# Patient Record
Sex: MALE | Race: WHITE | Employment: FULL TIME | URBAN - METROPOLITAN AREA
[De-identification: names, ages, dates, MRNs, and addresses within clinical notes are randomized per-mention and may not be internally consistent; named-entity substitution may affect disease eponyms.]

---

## 2017-02-13 PROBLEM — Z00.00 WELLNESS EXAMINATION: Status: ACTIVE | Noted: 2017-02-13

## 2017-04-11 ENCOUNTER — HOSPITAL ENCOUNTER (OUTPATIENT)
Dept: GENERAL RADIOLOGY | Age: 62
Discharge: HOME | End: 2017-04-11
Attending: FAMILY MEDICINE

## 2017-04-11 DIAGNOSIS — Z13.9 SCREENING PROCEDURE: ICD-10-CM

## 2017-04-11 PROCEDURE — 71020 XR CHEST PA LAT: CPT

## 2018-03-02 ENCOUNTER — HOSPITAL ENCOUNTER (OUTPATIENT)
Dept: ULTRASOUND IMAGING | Age: 63
Discharge: HOME | End: 2018-03-02
Attending: NURSE PRACTITIONER
Payer: COMMERCIAL

## 2018-03-02 DIAGNOSIS — I10 ESSENTIAL HYPERTENSION, BENIGN: ICD-10-CM

## 2018-03-02 DIAGNOSIS — I10 ESSENTIAL HYPERTENSION: ICD-10-CM

## 2018-03-02 PROCEDURE — 93975 VASCULAR STUDY: CPT

## 2019-01-03 ENCOUNTER — HOSPITAL ENCOUNTER (OUTPATIENT)
Dept: GENERAL RADIOLOGY | Age: 64
Discharge: HOME | End: 2019-01-03
Attending: NURSE PRACTITIONER
Payer: COMMERCIAL

## 2019-01-03 DIAGNOSIS — M25.511 ACUTE PAIN OF RIGHT SHOULDER: ICD-10-CM

## 2019-01-03 DIAGNOSIS — M25.532 LEFT WRIST PAIN: ICD-10-CM

## 2019-01-03 PROCEDURE — 73030 X-RAY EXAM OF SHOULDER: CPT

## 2019-01-03 PROCEDURE — 73110 X-RAY EXAM OF WRIST: CPT

## 2019-01-09 ENCOUNTER — HOSPITAL ENCOUNTER (OUTPATIENT)
Dept: PHYSICAL THERAPY | Age: 64
Discharge: HOME | End: 2019-01-09
Attending: NURSE PRACTITIONER
Payer: COMMERCIAL

## 2019-01-09 DIAGNOSIS — M25.511 ACUTE PAIN OF RIGHT SHOULDER: ICD-10-CM

## 2019-01-09 DIAGNOSIS — M25.532 LEFT WRIST PAIN: ICD-10-CM
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
## 2019-01-09 PROCEDURE — 97140 MANUAL THERAPY 1/> REGIONS: CPT

## 2019-01-09 PROCEDURE — 97161 PT EVAL LOW COMPLEX 20 MIN: CPT
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
## 2019-01-11 ENCOUNTER — APPOINTMENT (OUTPATIENT)
Dept: PHYSICAL THERAPY | Age: 64
End: 2019-01-11
Attending: NURSE PRACTITIONER

## 2019-01-11 ENCOUNTER — HOSPITAL ENCOUNTER (OUTPATIENT)
Dept: PHYSICAL THERAPY | Age: 64
Discharge: HOME | End: 2019-01-11
Attending: NURSE PRACTITIONER
Payer: COMMERCIAL
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
## 2019-01-11 DIAGNOSIS — M25.532 LEFT WRIST PAIN: ICD-10-CM

## 2019-01-11 PROCEDURE — 97165 OT EVAL LOW COMPLEX 30 MIN: CPT

## 2019-01-11 PROCEDURE — 97140 MANUAL THERAPY 1/> REGIONS: CPT

## 2019-01-11 PROCEDURE — 97110 THERAPEUTIC EXERCISES: CPT

## 2019-01-11 PROCEDURE — 97760 ORTHOTIC MGMT&TRAING 1ST ENC: CPT
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
## 2019-01-15 ENCOUNTER — HOSPITAL ENCOUNTER (OUTPATIENT)
Dept: PHYSICAL THERAPY | Age: 64
Discharge: HOME | End: 2019-01-15
Attending: NURSE PRACTITIONER
Payer: COMMERCIAL

## 2019-01-15 PROCEDURE — 97110 THERAPEUTIC EXERCISES: CPT

## 2019-01-15 PROCEDURE — 97140 MANUAL THERAPY 1/> REGIONS: CPT
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
## 2019-01-17 ENCOUNTER — HOSPITAL ENCOUNTER (OUTPATIENT)
Dept: PHYSICAL THERAPY | Age: 64
Discharge: HOME | End: 2019-01-17
Attending: NURSE PRACTITIONER
Payer: COMMERCIAL

## 2019-01-17 PROCEDURE — 97035 APP MDLTY 1+ULTRASOUND EA 15: CPT

## 2019-01-17 PROCEDURE — 97018 PARAFFIN BATH THERAPY: CPT

## 2019-01-17 PROCEDURE — 97140 MANUAL THERAPY 1/> REGIONS: CPT

## 2019-01-17 PROCEDURE — 97110 THERAPEUTIC EXERCISES: CPT
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
## 2019-01-22 ENCOUNTER — HOSPITAL ENCOUNTER (OUTPATIENT)
Dept: PHYSICAL THERAPY | Age: 64
Discharge: HOME | End: 2019-01-22
Attending: NURSE PRACTITIONER
Payer: COMMERCIAL

## 2019-01-22 PROCEDURE — 97110 THERAPEUTIC EXERCISES: CPT

## 2019-01-22 PROCEDURE — 97140 MANUAL THERAPY 1/> REGIONS: CPT

## 2019-01-22 PROCEDURE — 97032 APPL MODALITY 1+ESTIM EA 15: CPT

## 2019-01-22 PROCEDURE — 97035 APP MDLTY 1+ULTRASOUND EA 15: CPT
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
## 2019-01-24 ENCOUNTER — HOSPITAL ENCOUNTER (OUTPATIENT)
Dept: PHYSICAL THERAPY | Age: 64
Discharge: HOME | End: 2019-01-24
Attending: NURSE PRACTITIONER
Payer: COMMERCIAL

## 2019-01-24 PROCEDURE — 97140 MANUAL THERAPY 1/> REGIONS: CPT

## 2019-01-24 PROCEDURE — 97110 THERAPEUTIC EXERCISES: CPT
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
## 2019-01-31 ENCOUNTER — HOSPITAL ENCOUNTER (OUTPATIENT)
Dept: PHYSICAL THERAPY | Age: 64
Discharge: HOME | End: 2019-01-31
Attending: NURSE PRACTITIONER
Payer: COMMERCIAL

## 2019-01-31 PROCEDURE — 97018 PARAFFIN BATH THERAPY: CPT

## 2019-01-31 PROCEDURE — 97140 MANUAL THERAPY 1/> REGIONS: CPT

## 2019-01-31 PROCEDURE — 97032 APPL MODALITY 1+ESTIM EA 15: CPT

## 2019-01-31 PROCEDURE — 97110 THERAPEUTIC EXERCISES: CPT

## 2019-01-31 PROCEDURE — 97035 APP MDLTY 1+ULTRASOUND EA 15: CPT
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
## 2019-02-05 ENCOUNTER — HOSPITAL ENCOUNTER (OUTPATIENT)
Dept: PHYSICAL THERAPY | Age: 64
Discharge: HOME | End: 2019-02-05
Attending: NURSE PRACTITIONER
Payer: COMMERCIAL

## 2019-02-05 PROCEDURE — 97140 MANUAL THERAPY 1/> REGIONS: CPT

## 2019-02-05 PROCEDURE — 97032 APPL MODALITY 1+ESTIM EA 15: CPT

## 2019-02-05 PROCEDURE — 97035 APP MDLTY 1+ULTRASOUND EA 15: CPT

## 2019-02-05 PROCEDURE — 97110 THERAPEUTIC EXERCISES: CPT

## 2019-02-05 PROCEDURE — 97018 PARAFFIN BATH THERAPY: CPT
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
## 2019-02-07 ENCOUNTER — HOSPITAL ENCOUNTER (OUTPATIENT)
Dept: PHYSICAL THERAPY | Age: 64
Discharge: HOME | End: 2019-02-07
Attending: NURSE PRACTITIONER
Payer: COMMERCIAL

## 2019-02-07 PROCEDURE — 97110 THERAPEUTIC EXERCISES: CPT

## 2019-02-07 PROCEDURE — 97140 MANUAL THERAPY 1/> REGIONS: CPT
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
## 2019-02-12 ENCOUNTER — APPOINTMENT (OUTPATIENT)
Dept: PHYSICAL THERAPY | Age: 64
End: 2019-02-12
Attending: NURSE PRACTITIONER
Payer: COMMERCIAL

## 2019-02-12 ENCOUNTER — HOSPITAL ENCOUNTER (OUTPATIENT)
Dept: PHYSICAL THERAPY | Age: 64
Discharge: HOME | End: 2019-02-12
Attending: NURSE PRACTITIONER
Payer: COMMERCIAL

## 2019-02-12 NOTE — PROGRESS NOTES
Therapy Center at [address], [facility] [phone]   [phone] OUTPATIENT DAILY NOTE 
 
NAME: Joycelyn Fabry DATE: 2/12/2019 Patient canceled physical therapy appointment today - wants to hold further therapy until he gets MRI. Will plan to follow up on next scheduled visit.  
 
COLLINS SilverT

## 2019-02-14 ENCOUNTER — APPOINTMENT (OUTPATIENT)
Dept: PHYSICAL THERAPY | Age: 64
End: 2019-02-14
Attending: NURSE PRACTITIONER
Payer: COMMERCIAL

## 2019-02-14 ENCOUNTER — HOSPITAL ENCOUNTER (OUTPATIENT)
Dept: MRI IMAGING | Age: 64
Discharge: HOME | End: 2019-02-14
Attending: NURSE PRACTITIONER
Payer: COMMERCIAL

## 2019-02-14 DIAGNOSIS — M25.511 ACUTE PAIN OF RIGHT SHOULDER: ICD-10-CM

## 2019-02-14 DIAGNOSIS — M25.532 ACUTE PAIN OF LEFT WRIST: ICD-10-CM
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
## 2019-02-14 PROCEDURE — 73221 MRI JOINT UPR EXTREM W/O DYE: CPT

## 2019-02-15 NOTE — PROGRESS NOTES
You have a tear in the supraspinatus tendon noted. Also some degenerativ tearing of the inferior labrum. i'm referring you to orthopedic to evaluate for this.

## 2019-02-15 NOTE — PROGRESS NOTES
There appears to be a tear at one of the ligaments in your wrist.  Due to this, i'm again going to refer you to [facility] for evaluation.

## 2019-03-11 ENCOUNTER — HOSPITAL ENCOUNTER (OUTPATIENT)
Age: 64
Discharge: HOME | End: 2019-03-11
Attending: ORTHOPAEDIC SURGERY | Admitting: ORTHOPAEDIC SURGERY
Payer: COMMERCIAL

## 2019-03-11 ENCOUNTER — ANESTHESIA (OUTPATIENT)
Dept: SURGERY | Age: 64
End: 2019-03-11
Payer: COMMERCIAL

## 2019-03-11 ENCOUNTER — ANESTHESIA EVENT (OUTPATIENT)
Dept: SURGERY | Age: 64
End: 2019-03-11
Payer: COMMERCIAL

## 2019-03-11 VITALS
WEIGHT: 183 LBS | RESPIRATION RATE: 12 BRPM | HEART RATE: 62 BPM | TEMPERATURE: 97.7 F | OXYGEN SATURATION: 92 % | BODY MASS INDEX: 27.02 KG/M2 | SYSTOLIC BLOOD PRESSURE: 101 MMHG | DIASTOLIC BLOOD PRESSURE: 64 MMHG

## 2019-03-11 PROBLEM — M75.100 ROTATOR CUFF TEAR: Status: ACTIVE | Noted: 2019-03-11

## 2019-03-11 PROCEDURE — 74011250636 HC RX REV CODE- 250/636: Performed by: ANESTHESIOLOGY

## 2019-03-11 PROCEDURE — 74011250636 HC RX REV CODE- 250/636

## 2019-03-11 PROCEDURE — 77030018836 HC SOL IRR NACL ICUM -A: Performed by: ORTHOPAEDIC SURGERY

## 2019-03-11 PROCEDURE — 77030013367: Performed by: ORTHOPAEDIC SURGERY

## 2019-03-11 PROCEDURE — 76210000020 HC REC RM PH II FIRST 0.5 HR: Performed by: ORTHOPAEDIC SURGERY

## 2019-03-11 PROCEDURE — 77030006891 HC BLD SHV RESECT STRY -B: Performed by: ORTHOPAEDIC SURGERY

## 2019-03-11 PROCEDURE — 77030002933 HC SUT MCRYL J&J -A: Performed by: ORTHOPAEDIC SURGERY

## 2019-03-11 PROCEDURE — 77030003666 HC NDL SPINAL BD -A: Performed by: ORTHOPAEDIC SURGERY

## 2019-03-11 PROCEDURE — 74011000258 HC RX REV CODE- 258

## 2019-03-11 PROCEDURE — 77030019605: Performed by: ORTHOPAEDIC SURGERY

## 2019-03-11 PROCEDURE — 77030036560 HC SHLDR ARM PAD ABDUCTN S2SG -B: Performed by: ORTHOPAEDIC SURGERY

## 2019-03-11 PROCEDURE — 76010000162 HC OR TIME 1.5 TO 2 HR INTENSV-TIER 1: Performed by: ORTHOPAEDIC SURGERY

## 2019-03-11 PROCEDURE — 76060000034 HC ANESTHESIA 1.5 TO 2 HR: Performed by: ORTHOPAEDIC SURGERY

## 2019-03-11 PROCEDURE — 77030004453 HC BUR SHV STRY -B: Performed by: ORTHOPAEDIC SURGERY

## 2019-03-11 PROCEDURE — 74011250636 HC RX REV CODE- 250/636: Performed by: ORTHOPAEDIC SURGERY

## 2019-03-11 PROCEDURE — 77030027384 HC PRB ARTHSCP SERFAS STRY -C: Performed by: ORTHOPAEDIC SURGERY

## 2019-03-11 PROCEDURE — 77030038656 HC IMPL CLLGN SCAFF IMPL S&N -I: Performed by: ORTHOPAEDIC SURGERY

## 2019-03-11 PROCEDURE — 76942 ECHO GUIDE FOR BIOPSY: CPT | Performed by: ORTHOPAEDIC SURGERY

## 2019-03-11 PROCEDURE — 74011000250 HC RX REV CODE- 250

## 2019-03-11 PROCEDURE — C1713 ANCHOR/SCREW BN/BN,TIS/BN: HCPCS | Performed by: ORTHOPAEDIC SURGERY

## 2019-03-11 PROCEDURE — 77030032490 HC SLV COMPR SCD KNE COVD -B: Performed by: ORTHOPAEDIC SURGERY

## 2019-03-11 PROCEDURE — 76010010054 HC POST OP PAIN BLOCK: Performed by: ORTHOPAEDIC SURGERY

## 2019-03-11 PROCEDURE — 77030031139 HC SUT VCRL2 J&J -A: Performed by: ORTHOPAEDIC SURGERY

## 2019-03-11 PROCEDURE — 76210000006 HC OR PH I REC 0.5 TO 1 HR: Performed by: ORTHOPAEDIC SURGERY

## 2019-03-11 DEVICE — IMPLANTABLE DEVICE
Type: IMPLANTABLE DEVICE | Site: SHOULDER | Status: FUNCTIONAL
Brand: BONE ANCHORS WITH ARTHROSCOPIC DELIVERY SYSTEM

## 2019-03-11 DEVICE — IMPLANTABLE DEVICE
Type: IMPLANTABLE DEVICE | Site: SHOULDER | Status: FUNCTIONAL
Brand: ROTATION MEDICAL RECONSTITUTED COLLAGEN SCAFFOLD - ARTHROSCOPIC, MEDIUM

## 2019-03-11 RX ORDER — EPINEPHRINE 1 MG/ML
INJECTION INTRAMUSCULAR; INTRAVENOUS; SUBCUTANEOUS AS NEEDED
Status: DISCONTINUED | OUTPATIENT
Start: 2019-03-11 | End: 2019-03-11 | Stop reason: HOSPADM

## 2019-03-11 RX ORDER — ROPIVACAINE HYDROCHLORIDE 5 MG/ML
INJECTION, SOLUTION EPIDURAL; INFILTRATION; PERINEURAL
Status: COMPLETED | OUTPATIENT
Start: 2019-03-11 | End: 2019-03-11

## 2019-03-11 RX ORDER — OXYCODONE AND ACETAMINOPHEN 10; 325 MG/1; MG/1
1 TABLET ORAL AS NEEDED
Status: DISCONTINUED | OUTPATIENT
Start: 2019-03-11 | End: 2019-03-11 | Stop reason: HOSPADM

## 2019-03-11 RX ORDER — LIDOCAINE HYDROCHLORIDE 20 MG/ML
INJECTION, SOLUTION EPIDURAL; INFILTRATION; INTRACAUDAL; PERINEURAL AS NEEDED
Status: DISCONTINUED | OUTPATIENT
Start: 2019-03-11 | End: 2019-03-11 | Stop reason: HOSPADM

## 2019-03-11 RX ORDER — DEXAMETHASONE SODIUM PHOSPHATE 4 MG/ML
INJECTION, SOLUTION INTRA-ARTICULAR; INTRALESIONAL; INTRAMUSCULAR; INTRAVENOUS; SOFT TISSUE AS NEEDED
Status: DISCONTINUED | OUTPATIENT
Start: 2019-03-11 | End: 2019-03-11 | Stop reason: HOSPADM

## 2019-03-11 RX ORDER — PROPOFOL 10 MG/ML
INJECTION, EMULSION INTRAVENOUS AS NEEDED
Status: DISCONTINUED | OUTPATIENT
Start: 2019-03-11 | End: 2019-03-11 | Stop reason: HOSPADM

## 2019-03-11 RX ORDER — MIDAZOLAM HYDROCHLORIDE 1 MG/ML
2 INJECTION, SOLUTION INTRAMUSCULAR; INTRAVENOUS ONCE
Status: COMPLETED | OUTPATIENT
Start: 2019-03-11 | End: 2019-03-11

## 2019-03-11 RX ORDER — SODIUM CHLORIDE, SODIUM LACTATE, POTASSIUM CHLORIDE, CALCIUM CHLORIDE 600; 310; 30; 20 MG/100ML; MG/100ML; MG/100ML; MG/100ML
75 INJECTION, SOLUTION INTRAVENOUS CONTINUOUS
Status: DISCONTINUED | OUTPATIENT
Start: 2019-03-11 | End: 2019-03-11 | Stop reason: HOSPADM

## 2019-03-11 RX ORDER — HYDROMORPHONE HYDROCHLORIDE 2 MG/ML
0.5 INJECTION, SOLUTION INTRAMUSCULAR; INTRAVENOUS; SUBCUTANEOUS
Status: DISCONTINUED | OUTPATIENT
Start: 2019-03-11 | End: 2019-03-11 | Stop reason: HOSPADM

## 2019-03-11 RX ORDER — SODIUM CHLORIDE 0.9 % (FLUSH) 0.9 %
5-40 SYRINGE (ML) INJECTION AS NEEDED
Status: DISCONTINUED | OUTPATIENT
Start: 2019-03-11 | End: 2019-03-11 | Stop reason: HOSPADM

## 2019-03-11 RX ORDER — CEFAZOLIN SODIUM/WATER 2 G/20 ML
2 SYRINGE (ML) INTRAVENOUS ONCE
Status: COMPLETED | OUTPATIENT
Start: 2019-03-11 | End: 2019-03-11

## 2019-03-11 RX ORDER — SODIUM CHLORIDE 0.9 % (FLUSH) 0.9 %
5-40 SYRINGE (ML) INJECTION EVERY 8 HOURS
Status: DISCONTINUED | OUTPATIENT
Start: 2019-03-11 | End: 2019-03-11 | Stop reason: HOSPADM

## 2019-03-11 RX ORDER — FENTANYL CITRATE 50 UG/ML
100 INJECTION, SOLUTION INTRAMUSCULAR; INTRAVENOUS ONCE
Status: COMPLETED | OUTPATIENT
Start: 2019-03-11 | End: 2019-03-11

## 2019-03-11 RX ORDER — OXYCODONE HYDROCHLORIDE 5 MG/1
5 TABLET ORAL
Status: DISCONTINUED | OUTPATIENT
Start: 2019-03-11 | End: 2019-03-11 | Stop reason: HOSPADM

## 2019-03-11 RX ORDER — ONDANSETRON 2 MG/ML
INJECTION INTRAMUSCULAR; INTRAVENOUS AS NEEDED
Status: DISCONTINUED | OUTPATIENT
Start: 2019-03-11 | End: 2019-03-11 | Stop reason: HOSPADM

## 2019-03-11 RX ORDER — EPHEDRINE SULFATE 50 MG/ML
INJECTION, SOLUTION INTRAVENOUS AS NEEDED
Status: DISCONTINUED | OUTPATIENT
Start: 2019-03-11 | End: 2019-03-11 | Stop reason: HOSPADM

## 2019-03-11 RX ORDER — GLYCOPYRROLATE 0.2 MG/ML
INJECTION INTRAMUSCULAR; INTRAVENOUS AS NEEDED
Status: DISCONTINUED | OUTPATIENT
Start: 2019-03-11 | End: 2019-03-11 | Stop reason: HOSPADM

## 2019-03-11 RX ADMIN — Medication 2 G: at 10:25

## 2019-03-11 RX ADMIN — DEXAMETHASONE SODIUM PHOSPHATE 4 MG: 4 INJECTION, SOLUTION INTRA-ARTICULAR; INTRALESIONAL; INTRAMUSCULAR; INTRAVENOUS; SOFT TISSUE at 10:28

## 2019-03-11 RX ADMIN — EPHEDRINE SULFATE 10 MG: 50 INJECTION, SOLUTION INTRAVENOUS at 10:48

## 2019-03-11 RX ADMIN — EPHEDRINE SULFATE 5 MG: 50 INJECTION, SOLUTION INTRAVENOUS at 10:24

## 2019-03-11 RX ADMIN — EPHEDRINE SULFATE 10 MG: 50 INJECTION, SOLUTION INTRAVENOUS at 10:32

## 2019-03-11 RX ADMIN — EPHEDRINE SULFATE 10 MG: 50 INJECTION, SOLUTION INTRAVENOUS at 10:10

## 2019-03-11 RX ADMIN — MIDAZOLAM HYDROCHLORIDE 2 MG: 2 INJECTION, SOLUTION INTRAMUSCULAR; INTRAVENOUS at 09:06

## 2019-03-11 RX ADMIN — FENTANYL CITRATE 100 MCG: 50 INJECTION INTRAMUSCULAR; INTRAVENOUS at 09:06

## 2019-03-11 RX ADMIN — PROPOFOL 150 MG: 10 INJECTION, EMULSION INTRAVENOUS at 09:59

## 2019-03-11 RX ADMIN — ROPIVACAINE HYDROCHLORIDE 20 ML: 5 INJECTION, SOLUTION EPIDURAL; INFILTRATION; PERINEURAL at 09:08

## 2019-03-11 RX ADMIN — GLYCOPYRROLATE 0.1 MG: 0.2 INJECTION INTRAMUSCULAR; INTRAVENOUS at 10:37

## 2019-03-11 RX ADMIN — EPHEDRINE SULFATE 10 MG: 50 INJECTION, SOLUTION INTRAVENOUS at 10:15

## 2019-03-11 RX ADMIN — SODIUM CHLORIDE, SODIUM LACTATE, POTASSIUM CHLORIDE, AND CALCIUM CHLORIDE 75 ML/HR: 600; 310; 30; 20 INJECTION, SOLUTION INTRAVENOUS at 08:30

## 2019-03-11 RX ADMIN — ONDANSETRON 4 MG: 2 INJECTION INTRAMUSCULAR; INTRAVENOUS at 11:18

## 2019-03-11 RX ADMIN — SODIUM CHLORIDE, SODIUM LACTATE, POTASSIUM CHLORIDE, AND CALCIUM CHLORIDE: 600; 310; 30; 20 INJECTION, SOLUTION INTRAVENOUS at 11:03

## 2019-03-11 RX ADMIN — LIDOCAINE HYDROCHLORIDE 100 MG: 20 INJECTION, SOLUTION EPIDURAL; INFILTRATION; INTRACAUDAL; PERINEURAL at 09:59

## 2019-03-11 NOTE — OP NOTES
[facility]  OPERATIVE REPORT    Name:  Leonor Rogers  MR#:  [id number]  :  1955  ACCOUNT #:  [de-identified]  DATE OF SERVICE:  2019    PREOPERATIVE DIAGNOSIS:  Possible rotator cuff tear. POSTOPERATIVE DIAGNOSES:  1. Partial tear of the right rotator cuff. 2.  Mild degeneration of the labrum. 3.  Impingement of the right shoulder. PROCEDURE PERFORMED:  1. Mini open placement of bovine collagen graft, Regeneten. 2.  Debridement of degenerative labral tear. 3.  Subacromial decompression. SURGEON:  Bre Martin MD    ASSISTANT:  None. ANESTHESIA:  General.    COMPLICATIONS:  None. SPECIMENS REMOVED:  None. IMPLANTS:  None. ESTIMATED BLOOD LOSS:  Minimal.    PROCEDURE:  After an adequate level of general anesthesia was obtained, the right arm was prepped and draped in usual sterile fashion. He had full range of motion of the shoulder. In the preop area, he was _____ with a lot of pain in his [facility] joint. The arm was prepped and draped in the usual sterile fashion. The joint was distended with saline. Arthroscope introduced. An anterior portal was made. Photos made for the patient. The biceps was intact and the articular surface looked good. The biceps and the biceps anchor were probed and there was no significant peel back. The patient was 62 and did have some mild degenerative changes of the labrum, which were debrided. It was not felt he needed a tenotomy or repair at this time. His overall labrum was stable. The arthroscope was placed in the subacromial space. A lateral portal was made. He had marked hooking of the acromion. A decompression was performed with a shaver and a bur. The [facility] joint was inspected and he did not appear to have a lot of degenerative changes at the [facility] joint. He had a partial tear of the rotator cuff. He did not have enough loss of integrity. He needed a debridement with conversion to full-thickness.   It was felt that he was a candidate for a bovine collagen graft. The [employer] system was used. It was elected to perform a mini open repair. A small incision was made laterally incorporating the medial portal dissection was carried down through the subcutaneous tissue and bluntly in the direction of the fibers. The deltoid was divided to expose the rotator cuff. The partial tear was again identified and still it was not torn enough to warrant a full repair at this time. Thus, it was elected to use the graft. The graft was placed on top of the torn area. The soft tissue anchors were deployed initially on top and at the apex, also medially, and then the bony anchors were placed more inferiorly. The arm was put through a range of motion and there was no impingement of the graft. It was very stable at this point. The wound was then copiously irrigated. The deltoid was closed with Vicryl, the subcutaneous tissue with Monocryl, Monocryl subcuticular and subcutaneous sutures placed. The arm was placed in a shoulder immobilizer. Tolerated the procedure well.       Maris Laboy MD      [id number]  D:  03/11/2019 11:27  T:  03/11/2019 14:30  JOB #:  [id number]  CC:  [employer] Back pain

## 2019-03-11 NOTE — ANESTHESIA PREPROCEDURE EVALUATION
Anesthetic History               Review of Systems / Medical History  Patient summary reviewed and pertinent labs reviewed    Pulmonary        Sleep apnea  Smoker         Neuro/Psych         Psychiatric history    Comments: ADD Cardiovascular    Hypertension              Exercise tolerance: >4 METS     GI/Hepatic/Renal  Within defined limits              Endo/Other  Within defined limits           Other Findings   Comments: Daily ETOH; chronic PO pain meds         Physical Exam    Airway  Mallampati: III  TM Distance: 4 - 6 cm  Neck ROM: normal range of motion   Mouth opening: Normal     Cardiovascular    Rhythm: regular           Dental    Dentition: Caps/crowns     Pulmonary                 Abdominal  GI exam deferred       Other Findings            Anesthetic Plan    ASA: 3  Anesthesia type: general - interscalene block          Induction: Intravenous  Anesthetic plan and risks discussed with: Patient

## 2019-03-11 NOTE — H&P
Outpatient Surgery History and Physical:  Sharla Andrew was seen and examined. CHIEF COMPLAINT:    r shoulder . PE:   There were no vitals taken for this visit. Heart:   Regular rhythm      Lungs:  Are clear      Past Medical History:    Patient Active Problem List    Diagnosis    Wellness examination    Psychiatric disorder     ADHD      Essential hypertension, benign    ADD (attention deficit disorder)    Pure hypercholesterolemia    Lipoma of unspecified site    Impotence of organic origin       Surgical History:   Past Surgical History:   Procedure Laterality Date    HX HEENT  5/21/13    BSC excised today- between eyes    HX HEENT      uvulectomy with T&A       Social History: Patient  reports that he has been smoking. He has a 40.00 pack-year smoking history. he has never used smokeless tobacco. He reports that he drinks about 3.5 oz of alcohol per week. He reports that he uses drugs. Drug: OTC. Family History:   Family History   Problem Relation Age of Onset    Stroke Mother         TIAs    Heart Disease Mother     Heart Disease Father 72        MI       Allergies: Reviewed per EMR  No Known Allergies    Medications:    No current facility-administered medications on file prior to encounter. Current Outpatient Medications on File Prior to Encounter   Medication Sig    lisinopril (PRINIVIL, ZESTRIL) 40 mg tablet TAKE 1 TABLET BY MOUTH EVERY DAY    [START ON 4/13/2019] amphetamine-dextroamphetamine XR (ADDERALL XR) 30 mg XR capsule Take 1 Cap (30 mg total) by mouth every morningEarliest Fill Date: 4/13/19. Max Daily Amount: 30 mg    [START ON 3/14/2019] amphetamine-dextroamphetamine XR (ADDERALL XR) 30 mg XR capsule Take 1 Cap (30 mg total) by mouth every morningEarliest Fill Date: 3/14/19. Max Daily Amount: 30 mg    amphetamine-dextroamphetamine XR (ADDERALL XR) 30 mg XR capsule Take 1 Cap (30 mg total) by mouth every morningEarliest Fill Date: 2/12/19.   Max Daily Amount: 30 mg    amLODIPine (NORVASC) 5 mg tablet TAKE 1 TABLET BY MOUTH EVERY DAY    zolpidem (AMBIEN) 10 mg tablet Take 1 Tab by mouth nightly as needed for Sleep. Max Daily Amount: 10 mg.    sertraline (ZOLOFT) 100 mg tablet TAKE 1 TABLET BY MOUTH DAILY.  cyclobenzaprine (FLEXERIL) 10 mg tablet Take 1 Tab by mouth three (3) times daily as needed for Muscle Spasm(s).  sildenafil citrate (VIAGRA) 100 mg tablet Take 1 Tab by mouth as needed. The surgery is planned for the  shoulder. History and physical has been reviewed. The patient has been examined. There have been no significant clinical changes since the completion of the originally dated History and Physical.  Patient identified by surgeon; surgical site was confirmed by patient and surgeon. The patient is here today for outpatient surgery. I have examined the patient, no changes are noted in the patient's medical status. Necessity for the procedure/care is still present and the history and physical above is current. See the office notes for the full long term history of the problem. Please see the recent office notes for the musculoskeletal examination.     Signed By: Victor Hugo Arvizu MD     March 11, 2019 7:12 AM

## 2019-03-11 NOTE — ANESTHESIA POSTPROCEDURE EVALUATION
Procedure(s):  RIGHT SHOULDER ARTHROSCOPY  DECOMPRESSION ROTATOR CUFF REPAIR WITH BOVINE GRAFT/ INTERSCALENE.     Anesthesia Post Evaluation      Multimodal analgesia: multimodal analgesia used between 6 hours prior to anesthesia start to PACU discharge  Patient location during evaluation: PACU  Patient participation: complete - patient participated  Level of consciousness: awake  Pain management: adequate  Airway patency: patent  Anesthetic complications: no  Cardiovascular status: acceptable  Respiratory status: acceptable  Hydration status: acceptable  Post anesthesia nausea and vomiting:  none      Visit Vitals  /63 (BP 1 Location: Left arm, BP Patient Position: At rest)   Pulse 62   Temp 36.5 °C (97.7 °F)   Resp 12   Wt 83 kg (183 lb)   SpO2 92%   BMI 27.02 kg/m²

## 2019-03-11 NOTE — ANESTHESIA PROCEDURE NOTES
Peripheral Block    Start time: 3/11/2019 9:05 AM  End time: 3/11/2019 9:08 AM  Performed by: Ramon Morgan MD  Authorized by: Ramon Morgan MD       Pre-procedure:   Timeout Time: 09:05          Block Type:   Block Type:   Interscalene  Laterality:  Right  Monitoring:  Standard ASA monitoring, continuous pulse ox, frequent vital sign checks, heart rate, responsive to questions and oxygen  Injection Technique:  Single shot  Procedures: ultrasound guided and nerve stimulator    Patient Position: supine  Prep: chlorhexidine    Location:  Interscalene  Needle Type:  Stimuplex  Needle Gauge:  21 G  Needle Localization:  Anatomical landmarks and ultrasound guidance  Motor Response: minimal motor response >0.4 mA      Assessment:  Number of attempts:  1  Injection Assessment:  Incremental injection every 5 mL, local visualized surrounding nerve on ultrasound, negative aspiration for blood, no intravascular symptoms, no paresthesia and ultrasound image on chart  Patient tolerance:  Patient tolerated the procedure well with no immediate complications

## 2019-03-11 NOTE — PERIOP NOTES
PACU DISCHARGE NOTE    Vital signs stable, pain well controlled, alert and oriented times three or at baseline, follow up per surgeon, no anesthetic complications. Pt has been able to maintain O2 sats 90 and above since O2 removed the second time. Discharge instructions and prescription for Oxycodone given to pt and his wife, Jenny Michaels. Both voice understanding of instructions. Pt is tolerating PO and has had his IV removed. Pt to discharge door via wheelchair by Chandrika Lopez RN and left in care of his wife who also has his belongings.

## 2019-03-11 NOTE — PERIOP NOTES
Report to Carlitos Dia RN. Attempted to take patient off of O2 NC and patient's O2 saturation declined to 88% RA. Pt instructed on how to use incentive spirometer and instructed to deep breathe and cough multiple times. Pt is a 1ppd smoker. No known URI currently. Pt placed back on O2 2L NC at this time.

## 2019-03-11 NOTE — DISCHARGE INSTRUCTIONS
Post-Operative Instructions   For  Shoulder Arthroscopy Rotator Cuff Repair  Phone:  [phone]    1. Apply ice to the shoulder as needed. 2. You may shower after the arthroscopy. Keep dressings in place for the first three days and do not get them wet. After three days, you may remove the dressings and leave the steri-strip bandages in place; they will peel off naturally. 3. Stay in sling at all times except to shower. 4. Begin therapy as ordered. 5. Use any pain medication as instructed. You should take your pain medication as soon as you feel the anesthetic wearing off. Do not wait until you are in severe pain to begin taking your pain medication. 6. You may have some side effects from your pain medication. If you have nausea, try taking your medication with food. For itching, you may take over the counter Benadryl. 7. You may have been given a prescription for Phenergan. This medication is used for nausea and vomiting. You do not need to get this prescription filled unless you have a problem. 8. If you have a problem, please call [address] [place] [address] at [phone]    Mine Walker MD    [address] [place] [address], P.A.     TYPICAL SIDE EFFECTS OF PAIN MEDICATION:  *    Constipation: Drink lots of fluids. Over the counter stool softener if needed. *    Nausea: Take pain medication with food. Call your doctor with persistent nausea. ACTIVITY  · As tolerated and as directed by your doctor. · Bathe or shower as directed by your doctor. DIET  · Day of surgery: Clear liquids until no nausea or vomiting; small portion, light diet [place] foods (ex: baked chicken, plain rice, grits, scrambled eggs, toast). Nothing greasy, fried or spicy today. · Advance to regular diet on second day, unless your doctor orders otherwise. · If nausea and vomiting continues, call your doctor. PAIN  · Take pain medication as directed by your doctor.     · DO NOT take aspirin or blood thinners unless directed by your doctor. CALL YOUR DOCTOR IF    s Call your doctor if pain is NOT relieved by medication.   s Excessive bleeding that does not stop after holding pressure over the area  · Temperature of 101 degrees F or above  · Excessive redness, swelling or bruising, and/ or green or yellow, smelly discharge from incision    AFTER ANESTHESIA   · For the first 24 hours: DO NOT Drive, Drink alcoholic beverages, or Make important decisions. · Be aware of dizziness following anesthesia and while taking pain medication. DISCHARGE SUMMARY from Nurse    PATIENT INSTRUCTIONS:    After general anesthesia or intravenous sedation, for 24 hours or while taking prescription Narcotics:  · Limit your activities  · Do not drive and operate hazardous machinery  · Do not make important personal or business decisions  · Do  not drink alcoholic beverages  · If you have not urinated within 8 hours after discharge, please contact your surgeon on call. *  Please give a list of your current medications to your Primary Care Provider. *  Please update this list whenever your medications are discontinued, doses are      changed, or new medications (including over-the-counter products) are added. *  Please carry medication information at all times in case of emergency situations. Preventing Infection at Home  We care about preventing infection and avoiding the spread of germs - not only when you are in the hospital but also when you return home. When you return home from the hospital, its important to take the following steps to help prevent infection and avoid spreading germs that could infect you and others. Ask everyone in your home to follow these guidelines, too. Clean Your Hands  · Clean your hands whenever your hands are visibly dirty, before you eat, before or after touching your mouth, nose or eyes, and before preparing food.  Clean them after contact with body fluids, using the restroom, touching animals or changing diapers. · When washing hands, wet them with warm water and work up a lather. Rub hands for at least 15 seconds, then rinse them and pat them dry with a clean towel or paper towel. · When using hand sanitizers, it should take about 15 seconds to rub your hands dry. If not, you probably didnt apply enough . Cover Your Sneeze or Cough  Germs are released into the air whenever you sneeze or cough. To prevent the spread of infection:  · Turn away from other people before coughing or sneezing. · Cover your mouth or nose with a tissue when you cough or sneeze. Put the tissue in the trash. · If you dont have a tissue, cough or sneeze into your upper sleeve, not your hands. · Always clean your hands after coughing or sneezing. Care for Wounds  Your skin is your bodys first line of defense against germs, but an open wound leaves an easy way for germs to enter your body. To prevent infection:  · Clean your hands before and after changing wound dressings, and wear gloves to change dressings if recommended by your doctor. · Take special care with IV lines or other devices inserted into the body. If you must touch them, clean your hands first.  · Follow any specific instructions from your doctor to care for your wounds. Contact your doctor if you experience any signs of infection, such as fever or increased redness at the surgical or wound site. Keep a Clean Home  · Clean or wipe commonly touched hard surfaces like door handles, sinks, tabletops, phones and TV remotes. · Use products labeled disinfectant to kill harmful bacteria and viruses. · Use a clean cloth or paper towel to clean and dry surfaces. Wiping surfaces with a dirty dishcloth, sponge or towel will only spread germs. · Never share toothbrushes, hung, drinking glasses, utensils, razor blades, face cloths or bath towels to avoid spreading germs.   · Be sure that the linens that you sleep on are clean. · Keep pets away from wounds and wash your hands after touching pets, their toys or bedding. We care about you and your health. Remember, preventing infections is a team effort between you, your family, friends and health care providers. These are general instructions for a healthy lifestyle:    No smoking/ No tobacco products/ Avoid exposure to second hand smoke    Surgeon General's Warning:  Quitting smoking now greatly reduces serious risk to your health. Obesity, smoking, and sedentary lifestyle greatly increases your risk for illness    A healthy diet, regular physical exercise & weight monitoring are important for maintaining a healthy lifestyle    You may be retaining fluid if you have a history of heart failure or if you experience any of the following symptoms:  Weight gain of 3 pounds or more overnight or 5 pounds in a week, increased swelling in our hands or feet or shortness of breath while lying flat in bed. Please call your doctor as soon as you notice any of these symptoms; do not wait until your next office visit. Recognize signs and symptoms of STROKE:    F-face looks uneven    A-arms unable to move or move unevenly    S-speech slurred or non-existent    T-time-call 911 as soon as signs and symptoms begin-DO NOT go       Back to bed or wait to see if you get better-TIME IS BRAIN.
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
## 2019-04-03 ENCOUNTER — HOSPITAL ENCOUNTER (OUTPATIENT)
Dept: PHYSICAL THERAPY | Age: 64
Discharge: HOME | End: 2019-04-03
Payer: COMMERCIAL

## 2019-04-03 PROCEDURE — 97014 ELECTRIC STIMULATION THERAPY: CPT

## 2019-04-03 PROCEDURE — 97161 PT EVAL LOW COMPLEX 20 MIN: CPT

## 2019-04-03 PROCEDURE — 97140 MANUAL THERAPY 1/> REGIONS: CPT
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
## 2019-04-03 NOTE — PROGRESS NOTES
Emil Ferguson  : 1955  [facility]  Secondary:  Therapy Center at [facility], [address] [facility], [address]  [phone]   [phone]       OUTPATIENT PHYSICAL THERAPY: Daily Treatment Note 4/3/2019  Medical/referring diagnosis: Other articular cartilage disorders, right shoulder [M24.111]  Complete rotator cuff tear or rupture of right shoulder, not specified as traumatic [M75.121]  Impingement syndrome of right shoulder [M75.41]  Pre-treatment Symptoms/Complaints:  See assessment in chart  Pain: Initial:   no specific pain level reported Post Session:  No change in pain noted at end of session   Medications Last Reviewed:  4/3/2019  Updated Objective Findings: See evaluation note from today   TREATMENT:   MANUAL THERAPY: (12 minutes): Joint mobilization and Soft tissue mobilization was utilized and necessary because of the patient's restricted joint motion, painful spasm, loss of articular motion and restricted motion of soft tissue. With pt in supported supine position (B LEs elevated on wedge), passive ROM to R shoulder in multiple planes of motion (flexion, abduction, ER at 0, 45, and 90 degrees abduction) with intermittent inferior distraction mobilizations (grade 2-3) to R glenohumeral joint to improve R shoulder mobility and reduce muscular tightness and pain. With pt in supported supine position (B LEs elevated on wedge), STM and trigger point release to R pectoral musculature, biceps, and upper traps/deltoids to reduce muscular tightness and pain and improve R shoulder mobility. MODALITIES: (10 minutes): With pt in supported sitting position, cold pack applied to R shoulder with concurrent TENS (sweep, 17-20 intensity level) to same region to reduce pain and inflammation at end of session. Treatment/Session Summary:    · Response to Treatment:  No change in pain noted at end of session.         No adverse reactions/unusual changes observed/reported in clinical status this session.   · Communication/Consultation:  Called Dr. Marguerita Spatz office and left message with his nurse asking about protocol they want us to use and when pt can discharge sling  · Equipment provided today:  None today  · Recommendations/Intent for next treatment session: Next visit will focus on increasing R shoulder mobility, working on shoulder isometrics to increase strength gently  Treatment Plan of Care Effective Dates:  4/3/2019 to 6/2/2019  Total Treatment Billable Duration:  12 minutes  PT Patient Time In/Time Out  Time In: 1102  Time Out: RONAN Mosqueda    Future Appointments   Date Time Provider Patrice Salter   4/10/2019  8:45 AM Yelena Valdovinos PTA [facility]   4/12/2019  8:45 AM MAX ResendezRPCARLIN D   4/17/2019  8:45 AM SFD PHYSICIAL THERAPIST [facility]   4/19/2019  9:30 AM Jonah Juarez, MAX [facility]   4/24/2019  8:45 AM [place] Point B, DPT [facility]   4/26/2019  8:00 AM [place] B, DPT [facility]   4/29/2019  8:00 AM [place] B, DPT [facility]   5/1/2019  8:00 AM Gary Lutz, DPT [facility]

## 2019-04-03 NOTE — PROGRESS NOTES
Chelsey Parker  : 1955  Primary: [facility]  Secondary:  Therapy Center at [facility], [address] [place], [place], [address] [facility]  [phone]   [phone]       OUTPATIENT PHYSICAL THERAPY:Discontinuation Summary 4/3/2019    ICD-10: Treatment Diagnosis: Pain in right shoulder (M25.511)    Stiffness of right shoulder, not elsewhere classified (M25.611)   Precautions/Allergies:   Patient has no known allergies. MD Orders: Evaluate and treat MEDICAL/REFERRING DIAGNOSIS:  Other articular cartilage disorders, right shoulder [M24.111]  Complete rotator cuff tear or rupture of right shoulder, not specified as traumatic [M75.121]  Impingement syndrome of right shoulder [M75.41]   DATE OF ONSET: 2018  REFERRING PHYSICIAN: Stormy Guzmán MD  RETURN PHYSICIAN APPOINTMENT: unspecified   This section established at most recent assessment  Chelsey Parker has been seen in physical therapy from 2019 to 2019 for 9 visits. Treatment has been discontinued at this time due to patient failing to return for additional treatment (pt going to get MRI of his shoulder before returning to therapy). The below goals were not reassessed prior to discontinuation secondary to pt failing to attend additional sessions. Thank you for this referral.      PROBLEM LIST (Impacting functional limitations):  1. Decreased Strength  2. Decreased ADL/Functional Activities  3. Increased Pain  4. Decreased Activity Tolerance  5. Decreased Work Simplification/Energy Conservation Techniques  6. Increased Fatigue  7. Decreased Flexibility/Joint Mobility  8. Edema/Girth INTERVENTIONS PLANNED: (Treatment may consist of any combination of the following)  1. Bed Mobility  2. Cold  3. Electrical Stimulation  4. Heat  5. Home Exercise Program (HEP)  6. Manual Therapy  7. Neuromuscular Re-education/Strengthening  8. Range of Motion (ROM)  9.  Therapeutic Activites  10. Therapeutic Exercise/Strengthening  11. Transcutaneous Electrical Nerve Stimulation (TENS)  12. Ultrasound (US)   TREATMENT PLAN:  Effective Dates/Frequency/Duration: Twice per week from 1/9/2019 until 3/10/2019 (8 weeks). GOALS: (Goals have been discussed and agreed upon with patient.)  Short-Term Functional Goals: Time Frame: 4 weeks  1. Pt will be compliant with HEP in order to increase UE strength/endurance/mobility to improve functional mobility and overall quality of life. 2. Pt will improve score on the Disabilities of the Arm, Shoulder, and Hand (DASH) Questionnaire to 32/55 in order to improve independence with ADLs and IADLs and to improve overall quality of life. 3. Pt will increase right shoulder abduction AROM to 90 degrees with minimal to no increase in pain in order to improve functional mobility and ability to reach to side. 4. 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 Discharge Goals: Time Frame: 8 weeks  1. Pt will be independent with HEP in order to increase UE strength/endurance/mobility to improve functional mobility and overall quality of life. 2. Pt will improve score on the Disabilities of the Arm, Shoulder, and Hand (DASH) Questionnaire to 25/55 in order to improve independence with ADLs and IADLs and to improve overall quality of life. 3. Pt will increase right shoulder abduction AROM to 120 degrees with minimal to no increase in pain in order to improve functional mobility and ability to reach up to his side. 4. Pt will be able to demonstrate good body mechanics while lifting 40 lb object up from the floor in order to minimize pain while lifting heavier objects at his job.   Rehabilitation Potential For Stated Goals: Good    Thank you for this referral,  Bethany Trujillo DPT

## 2019-04-10 ENCOUNTER — HOSPITAL ENCOUNTER (OUTPATIENT)
Dept: PHYSICAL THERAPY | Age: 64
Discharge: HOME | End: 2019-04-10
Payer: COMMERCIAL

## 2019-04-10 PROCEDURE — 97110 THERAPEUTIC EXERCISES: CPT

## 2019-04-10 PROCEDURE — 97140 MANUAL THERAPY 1/> REGIONS: CPT

## 2019-04-10 PROCEDURE — 97014 ELECTRIC STIMULATION THERAPY: CPT

## 2019-04-10 NOTE — PROGRESS NOTES
Gaviota Mean  : 1955  Primary: [facility] [employer] [facility]*  Secondary:  Therapy Center at [facility], [address] [facility], [place] [address] [address] [facility]  [phone]   [phone]       OUTPATIENT PHYSICAL THERAPY: Daily Treatment Note 4/10/2019  Medical/referring diagnosis: Other articular cartilage disorders, right shoulder [M24.111]  Complete rotator cuff tear or rupture of right shoulder, not specified as traumatic [M75.121]  Impingement syndrome of right shoulder [M75.41]  Pre-treatment Symptoms/Complaints:  See assessment in chart  Pain: Initial:   2/10. Post Session:  -3/10 a bit achy    Medications Last Reviewed:  4/10/2019  Updated Objective Findings: None Today   TREATMENT:     THERAPEUTIC EXERCISE: (8 minutes):  Exercises per grid below to improve mobility and strength. Date:  4-10-19 Date:   Date:     Activity/Exercise Parameters Parameters Parameters   Isometric - shoulder flexion  10 x 3 sec hold      Isometric - shoulder extension  10 x 3 sec hold                                      MANUAL THERAPY: (30 minutes): Joint mobilization and Soft tissue mobilization was utilized and necessary because of the patient's restricted joint motion, painful spasm, loss of articular motion and restricted motion of soft tissue. With pt in supported supine position (B LEs elevated on wedge), passive ROM to R shoulder in multiple planes of motion (flexion, abduction, ER at 0, 45, and 90 degrees abduction) with intermittent inferior distraction mobilizations (grade 2-3) to R glenohumeral joint to improve R shoulder mobility and reduce muscular tightness and pain. With pt in supported supine position (B LEs elevated on wedge), STM and trigger point release to R pectoral musculature, biceps, and upper traps/deltoids to reduce muscular tightness and pain and improve R shoulder mobility. MODALITIES: (12 minutes):  With pt in supported sitting position, cold pack applied to R shoulder with concurrent TENS (sweep, 17-20 intensity level) to same region to reduce pain and inflammation at end of session. Skin clear and intact. Treatment/Session Summary:    · Response to Treatment:  No major pain noted at end of session. No adverse reactions/unusual changes observed/reported in clinical status this session.   · Communication/Consultation:  None today  · Equipment provided today:  None today  · Recommendations/Intent for next treatment session: Next visit will focus on increasing R shoulder mobility, working on shoulder isometrics to increase strength gently  Treatment Plan of Care Effective Dates:  4/3/2019 to 6/2/2019  Total Treatment Billable Duration:  50 minutes  PT Patient Time In/Time Out  Time In: 0845  Time Out: [address], [facility]    Future Appointments   Date Time Provider Patrice Salter   4/12/2019  8:45 AM Mitchell Kan PTA [facility]   4/17/2019  8:45 AM SHELLI PHYSICIAL THERAPIST DARIUS D   4/19/2019  9:30 AM MAX Parish [facility]   4/24/2019  8:45 AM Harper Mortimer B, DPT [facility]   4/26/2019  8:00 AM Harper Mortimer B, DPT [facility]   4/29/2019  8:00 AM SHELLI PHYSICIAL THERAPIST DARIUS [facility]   5/1/2019  8:00 AM RONAN Boykin [facility]

## 2019-04-12 ENCOUNTER — HOSPITAL ENCOUNTER (OUTPATIENT)
Dept: PHYSICAL THERAPY | Age: 64
Discharge: HOME | End: 2019-04-12
Payer: COMMERCIAL

## 2019-04-12 PROCEDURE — 97140 MANUAL THERAPY 1/> REGIONS: CPT

## 2019-04-12 PROCEDURE — 97014 ELECTRIC STIMULATION THERAPY: CPT

## 2019-04-12 PROCEDURE — 97110 THERAPEUTIC EXERCISES: CPT

## 2019-04-12 NOTE — PROGRESS NOTES
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 Working on isometric exercises at home with light pressure. Using ice at home. Pain: Initial:   5-6/10. Post Session:     Medications Last Reviewed:  2019  Updated Objective Findings: None Today   TREATMENT:     THERAPEUTIC EXERCISE: (8 minutes):  Exercises per grid below to improve mobility and strength. Date:  4-10-19 Date:  19 Date:     Activity/Exercise Parameters Parameters Parameters   Isometric - shoulder flexion  10 x 3 sec hold  3 second hold x 10 reps    Isometric - shoulder extension  10 x 3 sec hold  3 second hold x 10 reps                                    MANUAL THERAPY: (30 minutes): Joint mobilization and Soft tissue mobilization was utilized and necessary because of the patient's restricted joint motion, painful spasm, loss of articular motion and restricted motion of soft tissue. With pt in supported supine position (B LEs elevated on wedge), passive ROM to R shoulder in multiple planes of motion (flexion, abduction, ER at 0, 45, and 90 degrees abduction) with intermittent inferior distraction mobilizations (grade 2-3) to R glenohumeral joint to improve R shoulder mobility and reduce muscular tightness and pain.  With pt in supported in sitting with right arm on pillows and plinth  (B LEs elevated on wedge), STM to right upper arm, deltoid, upper trapm pectoralis and pectoralis minor, scapular border and posterior shoulder to help decrease tightness and increase blood flow. MODALITIES: (15 minutes): With pt in supported sitting position, cold pack applied to R shoulder concurrently with IFES surrounding right shoulder at 28 intensity to same region to reduce pain and inflammation at end of session. Skin clear and intact. Treatment/Session Summary:    · Response to Treatment:  No increased pain reported at end of treatment. Patient reported ice and IFES helps with pain. Patient with good tolerance to exercises and manual therapy. Continue per plan of care. No adverse reactions/unusual changes observed/reported in clinical status this session.   · Communication/Consultation:  None today  · Equipment provided today:  None today  · Recommendations/Intent for next treatment session: Next visit will focus on increasing R shoulder mobility, working on shoulder isometrics to increase strength gently  Treatment Plan of Care Effective Dates:  4/3/2019 to 6/2/2019  Total Treatment Billable Duration:  50 minutes  PT Patient Time In/Time Out  Time In: 0845  Time Out: [address] [facility]    Future Appointments   Date Time Provider Patrice Salter   4/17/2019  8:45 AM SHELLI PHYSICIAL THERAPIST DARIUS D   4/19/2019  9:30 AM Claudia Campos PTA [facility]   4/24/2019  8:45 AM RONAN Martinez   4/26/2019  8:00 AM RONAN Martinez   4/29/2019  8:00 AM SHELLI PHYSICIAL THERAPIST DARIUS [facility]   5/1/2019  8:00 AM RONAN Chavez [facility]

## 2019-04-17 ENCOUNTER — HOSPITAL ENCOUNTER (OUTPATIENT)
Dept: PHYSICAL THERAPY | Age: 64
Discharge: HOME | End: 2019-04-17
Payer: COMMERCIAL

## 2019-04-17 PROCEDURE — 97140 MANUAL THERAPY 1/> REGIONS: CPT

## 2019-04-17 PROCEDURE — 97110 THERAPEUTIC EXERCISES: CPT

## 2019-04-17 PROCEDURE — 97014 ELECTRIC STIMULATION THERAPY: CPT

## 2019-04-19 ENCOUNTER — HOSPITAL ENCOUNTER (OUTPATIENT)
Dept: PHYSICAL THERAPY | Age: 64
Discharge: HOME | End: 2019-04-19
Payer: COMMERCIAL

## 2019-04-19 PROCEDURE — 97140 MANUAL THERAPY 1/> REGIONS: CPT

## 2019-04-19 PROCEDURE — 97014 ELECTRIC STIMULATION THERAPY: CPT

## 2019-04-19 NOTE — PROGRESS NOTES
Clyde Merritt  : 1955  Primary: Alyson Coeras Of [facility] Elenasaul*  Secondary:  [facility] at 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 \"  Pain: Initial:   5-/10. Post Session:  4/10   Medications Last Reviewed:  2019  Updated Objective Findings: None Today   TREATMENT:     THERAPEUTIC EXERCISE: (0 minutes):  Exercises per grid below to improve mobility and strength. Date:  4-10-19 Date:  19 Date:  19   Activity/Exercise Parameters Parameters Parameters   Isometric - shoulder flexion  10 x 3 sec hold  3 second hold x 10 reps 3 second hold x 10 reps   Isometric - shoulder extension  10 x 3 sec hold  3 second hold x 10 reps 3 second hold x 10 reps                                   MANUAL THERAPY: (30 minutes): Joint mobilization and Soft tissue mobilization was utilized and necessary because of the patient's restricted joint motion, painful spasm, loss of articular motion and restricted motion of soft tissue. With pt in supported in sitting with right arm on pillows and plinth patient recieved STM to right upper arm, deltoid, upper trap, right cervical area,  pectoralis and pectoralis minor, scapular border and posterior shoulder to help decrease tightness and increase blood flow. Patient then supine with knee wedge in place for PROM in all planes in comfortable range. MODALITIES: (15 minutes):  With pt in supine with knee wedge in place cold pack applied to R shoulder concurrently with IFES surrounding right shoulder at 30 intensity to same region to reduce pain and inflammation at end of session. Skin clear and intact. Treatment/Session Summary:    · Response to Treatment:  No increased pain at end of treatment. Patient continues to report wearing sling and following protocol for shoulder at home. ICE an IFES help decrease pain in shoulder. Palpable tightness in right upper arm, upper trap and neck decreased following treatment. Continue per plan of care. No adverse reactions/unusual changes observed/reported in clinical status this session.   · Communication/Consultation:  None today  · Equipment provided today:  None today  · Recommendations/Intent for next treatment session: Next visit will focus on increasing R shoulder mobility, working on shoulder isometrics to increase strength gently  Treatment Plan of Care Effective Dates:  4/3/2019 to 6/2/2019  Total Treatment Billable Duration:  45 minutes  PT Patient Time In/Time Out  Time In: 0930  Time Out: [address] , [facility]    Future Appointments   Date Time Provider Patrice Salter   4/24/2019  8:45 AM RONAN Maurice   4/26/2019  8:00 AM RONAN Maurice   4/29/2019  8:00 AM SHELLI PHYSICIAL THERAPIST DARIUS MARQUES   5/1/2019  8:00 AM RONAN Tao [facility]

## 2019-04-24 ENCOUNTER — HOSPITAL ENCOUNTER (OUTPATIENT)
Dept: PHYSICAL THERAPY | Age: 64
Discharge: HOME | End: 2019-04-24
Payer: COMMERCIAL

## 2019-04-24 PROCEDURE — 97140 MANUAL THERAPY 1/> REGIONS: CPT

## 2019-04-24 PROCEDURE — 97014 ELECTRIC STIMULATION THERAPY: CPT

## 2019-04-24 NOTE — PROGRESS NOTES
Jose Guadalupeanuradhamaria r Frazier  : 1955  Primary: 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 Post Session:  5/10   Medications Last Reviewed:  2019  Updated Objective Findings: None Today   TREATMENT:     THERAPEUTIC EXERCISE: (0 minutes):  Exercises per grid below to improve mobility and strength. Date:  4-10-19 Date:  19 Date:  19 Date:  2019   Activity/Exercise Parameters Parameters Parameters    Isometric - shoulder flexion  10 x 3 sec hold  3 second hold x 10 reps 3 second hold x 10 reps    Isometric - shoulder extension  10 x 3 sec hold  3 second hold x 10 reps 3 second hold x 10 reps                                         MANUAL THERAPY: (40 minutes): Joint mobilization and Soft tissue mobilization was utilized and necessary because of the patient's restricted joint motion, painful spasm, loss of articular motion and restricted motion of soft tissue. With pt in supported in supine position, performed PROM to R shoulder in multiple planes of motion (flexion, abduction, ER at 15 and 45 degrees abduction) within comfortable range followed by STM to right biceps musculature, deltoid, upper trap, pectoralis major and minor, and posterior shoulder to help decrease tightness and increase blood flow. MODALITIES: (15 minutes):  With pt in supine with knee wedge in place cold pack applied to R shoulder concurrently with IFES surrounding right shoulder at 26 intensity to same region to reduce pain and inflammation at end of session. Skin clear and intact. Treatment/Session Summary:    · Response to Treatment:  Pt demonstrating good mobility at his R shoulder this session with minimal to no reports of increased pain with range of motion. He reports no change in his pain, but does state he feels like he is slowly improving. Discussed with pt that he should start working on pain-free isometrics at home and call MD regarding use of his sling (since his next MD appointment isn't until 5/30/2019). No adverse reactions/unusual changes observed/reported in clinical status this session.   · Communication/Consultation:  Left message at Dr. Jose Garcia office regarding when to discharge sling and asking for specifications on protocol  · Equipment provided today:  None today  · Recommendations/Intent for next treatment session: Next visit will focus on increasing R shoulder mobility, working on shoulder isometrics to increase strength gently  Treatment Plan of Care Effective Dates:  4/3/2019 to 6/2/2019  Total Treatment Billable Duration:  50 minutes  PT Patient Time In/Time Out  Time In: 0845  Time Out: RONAN Saldivar    Future Appointments   Date Time Provider Patrice Salter   4/26/2019  8:00 AM Zandra ANTONIO DPT [facility]   4/29/2019  8:00 AM SHELLI PHYSICIAL THERAPIST DARIUS MARQUES   5/1/2019  8:00 AM RONAN Lara [facility]

## 2019-04-26 ENCOUNTER — HOSPITAL ENCOUNTER (OUTPATIENT)
Dept: PHYSICAL THERAPY | Age: 64
Discharge: HOME | End: 2019-04-26
Payer: COMMERCIAL

## 2019-04-26 PROCEDURE — 97140 MANUAL THERAPY 1/> REGIONS: CPT

## 2019-04-26 NOTE — PROGRESS NOTES
Edwin Rojas  : 1955  Primary: Ursula Cardoza Of [facility] Maria Elenasaul*  Secondary:  [facility] [place] [address], [address] [facility], [place] [address] [address] [facility]  [phone]   [phone]       OUTPATIENT PHYSICAL THERAPY:Progress Report 2019    ICD-10: Treatment Diagnosis: Pain in right shoulder (M25.511)    Stiffness of right shoulder, not elsewhere classified (M25.611)   Precautions/Allergies:   Patient has no known allergies. 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 This session, pt demonstrated improved R UE strength/endurance, slightly improved R shoulder mobility, less postural deficits, and improved functional mobility as evident by a score of 49/55 on the Disabilities of the Arm, Shoulder, and Hand Questionnaire (initial score of 54/55, with higher scores indicating increased disability). Despite the above listed improvements, pt continues to demonstrate decreased R UE strength/endurance, decreased right shoulder mobility with associated pain, multiple postural deficits and decreased functional mobility. At baseline, pt is  and has to lift/push/pull heavy weight on a regular basis.  Pt may continue to benefit from skilled PT to address the above listed deficits to improve ability to perform pain-free ADLs/IADLs and to improve overall quality of life prior to discharge. PROBLEM LIST (Impacting functional limitations):  1. Decreased Strength  2. Decreased ADL/Functional Activities  3. Increased Pain  4. Decreased Activity Tolerance  5. Decreased Work Simplification/Energy Conservation Techniques  6. Increased Fatigue  7. Decreased Flexibility/Joint Mobility  8. Edema/Girth INTERVENTIONS PLANNED: (Treatment may consist of any combination of the following)  1. Bed Mobility  2. Cold  3. Electrical Stimulation  4. Heat  5. Home Exercise Program (HEP)  6. Manual Therapy  7. Neuromuscular Re-education/Strengthening  8. Range of Motion (ROM)  9. Therapeutic Activites  10. Therapeutic Exercise/Strengthening  11. Transcutaneous Electrical Nerve Stimulation (TENS)  12. Ultrasound (US)   TREATMENT PLAN:  Effective Dates/Frequency/Duration: Twice per week from 4/3/2019 until 6/2/2019 (8 weeks). GOALS: (Goals have been discussed and agreed upon with patient.)  Short-Term Functional Goals: Time Frame: 4 weeks  1. Pt will be compliant with HEP in order to increase UE strength/endurance/mobility to improve functional mobility and overall quality of life. (MET , 4/26/2019)   2. Pt will improve score on the Disabilities of the Arm, Shoulder, and Hand (DASH) Questionnaire to 44/55 in order to improve independence with ADLs and IADLs and to improve overall quality of life. (PROGRESSING , 4/26/2019)   3. Pt will increase right shoulder PROM to 150 degrees with minimal to no increase in pain in order to improve functional mobility and ability to reach up. (PROGRESSING , 4/26/2019)   4. Pt will be able to demonstrate good body mechanics while lifting 5 lb object up from the floor in order to minimize pain while lifting heavier items off of floor. (PROGRESSING , 4/26/2019)   Discharge Goals: Time Frame: 8 weeks  1. Pt will be independent with HEP in order to increase UE strength/endurance/mobility to improve functional mobility and overall quality of life.   2. Pt will improve score on the Disabilities of the Arm, Shoulder, and Hand (DASH) Questionnaire to 34/55 in order to improve independence with ADLs and IADLs and to improve overall quality of life. 3. Pt will increase right shoulder flexion AROM to 120 degrees with minimal to no increase in pain in order to improve functional mobility and ability to reach up. 4. Pt will be able to demonstrate good body mechanics while lifting 10 lb object up from the floor in order to minimize pain while performing job duties. CLINICAL DECISION MAKING:   OUTCOME MEASURE:   Tool Used: Disabilities of the Arm, Shoulder and Hand (DASH) Questionnaire - Quick Version  Score:  Initial: 54/55  Most Recent: 49/55 (Date: 4/26/2019 )   Interpretation of Score: The DASH is designed to measure the activities of daily living in person's with upper extremity dysfunction or pain. Each section is scored on a 1-5 scale, 5 representing the greatest disability. The scores of each section are added together for a total score of 55.       UPDATED OBJECTIVE FINDINGS:    Observation/Orthostatic Postural Assessment:    The following postural deficits were noted in sitting: loss of cervical lordosis, increased thoracic kyphosis, forward head, rounded shoulders and posterior pelvic tilt; pt not wearing sling on R UE and is cradling it - pt with slightly increased upright posture on 4/26/2019, wearing sling with abduction pillow   The following postural deficits were noted in standing: slight forward trunk flexion  - slightly improved upright posture on 4/26/2019    ROM:    Initial measurement: (on 4/3/2019) Initial measurement: (on 4/3/2019) Reassessment measurement: (on 4/26/2019) Reassessment measurement:    L shoulder AROM:     Shoulder flexion: 162 degrees     Shoulder abduction: 165 degrees     Shoulder extension: 84 degrees     Shoulder ER: 72 degrees     Shoulder IR: level of T6    R UE AROM: NT on 4/3/2019  R UE PROM:     Shoulder flexion: 132 degrees (pain in superior shoulder)     Shoulder abduction: 108 degrees (pain in superior shoulder)     Shoulder ER at 15 degrees abduction: 71 degrees     Shoulder ER at 45 degrees abduction: 75 degrees      Shoulder ER at 90 degrees abduction: 71 degrees   R UE AROM:      Shoulder flexion: 108 degrees (pain in superior shoulder - cues to avoid overhead)     Shoulder abduction: 85 degrees (before significant shoulder elevation - pain in superior shoulder)     Shoulder extension: 52 degrees (before significant compensation)     Shoulder ER: 50 degrees     Shoulder IR: NT due to precautions  R UE PROM:     Shoulder flexion: 134 degrees (to discomfort - pain in superior shoulder)     Shoulder abduction: 109 degrees (to discomfort - pain in superior shoulder)     Shoulder ER at 15 degrees abduction: 64 degrees     Shoulder ER at 45 degrees abduction: 75 degrees      Shoulder ER at 90 degrees abduction: 76 degrees        Strength:    Initial measurement: (on 4/3/2019) Initial measurement: (on 4/3/2019)  Reassessment  (on 4/26/2019) Reassessment    L UE: R UE: R UE: (no resistance applied due to precautions)    Shoulder flexion  4/5  NT  3-/5    Shoulder extension 5/5  NT  3-/5    Shoulder abduction 4/5  NT  3-/5    Shoulder adduction 5/5  NT  3/5    Shoulder IR  5/5  NT  3/5    Shoulder ER 5/5  NT  3-/5    Elbow flexion 5/5  NT  3/5    Elbow extension 5/5  NT  3/5    Wrist flexion  5/5  NT  5/5    Wrist extension 5/5  NT  5/5        MEDICAL NECESSITY:   · Patient is expected to demonstrate progress in strength, range of motion and functional technique to improve ability to perform pain-free ADLs/IADLs and to improve overall quality of life. REASON FOR SERVICES/OTHER COMMENTS:  · Patient continues to require modification of therapeutic interventions to increase complexity of exercises.    Use of outcome tool(s) and clinical judgement create a POC that gives a: Clear prediction of patient's progress: LOW COMPLEXITY      Total Duration:  PT Patient Time In/Time Out  Time In: 0800  Time Out: 0852    Rehabilitation Potential For Stated Goals: Good  Regarding Matt Durán's therapy, I certify that the treatment plan above will be carried out by a therapist or under their direction. Thank you for this referral,  Hilbert Goldberg, DPT     Referring Physician Signature: Delores Oliveros MD No Signature is Required for this note.

## 2019-04-26 NOTE — PROGRESS NOTES
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 Post Session: no change in pain reported at end of session   Medications Last Reviewed:  2019  Updated Objective Findings: See evaluation note from today   TREATMENT:     THERAPEUTIC EXERCISE: (0 minutes):  Exercises per grid below to improve mobility and strength. Date:  4-10-19 Date:  19 Date:  19 Date:     Activity/Exercise Parameters Parameters Parameters    Isometric - shoulder flexion  10 x 3 sec hold  3 second hold x 10 reps 3 second hold x 10 reps    Isometric - shoulder extension  10 x 3 sec hold  3 second hold x 10 reps 3 second hold x 10 reps                                         MANUAL THERAPY: (40 minutes): Joint mobilization and Soft tissue mobilization was utilized and necessary because of the patient's restricted joint motion, painful spasm, loss of articular motion and restricted motion of soft tissue.  With pt in supported in supine position, performed PROM to R shoulder in multiple planes of motion (flexion, abduction, ER at 15, 45, and 90 degrees abduction) within comfortable range followed by STM to right biceps musculature, deltoid, upper trap, and pectoralis major and minor with cross friction massage at R shoulder incision to help decrease tightness, break up scar adhesions and increase blood flow. Also performed intermittent inferior distraction mobilizations at R glenohumeral joint (grade 2-3) to reduce muscular tightness and pain. MODALITIES: (10 minutes): With pt in supine with knee wedge in place cold pack applied to R shoulder to reduce pain and inflammation at end of session. Skin clear and intact. Treatment/Session Summary:    · Response to Treatment:  See assessment in chart. No adverse reactions/unusual changes observed/reported in clinical status this session.   · Communication/Consultation:  None today  · Equipment provided today:  None today  · Recommendations/Intent for next treatment session: Next visit will focus on increasing R shoulder mobility, working on shoulder isometrics to increase strength gently  Treatment Plan of Care Effective Dates:  4/3/2019 to 6/2/2019  Total Treatment Billable Duration:  40 minutes  PT Patient Time In/Time Out  Time In: 0800  Time Out: [address], DPT    Future Appointments   Date Time Provider Patrice Salter   4/29/2019  8:00 AM SHELLI PHYSICIAL THERAPIST DARIUS MARQUES   5/1/2019  8:00 AM RONAN Martinez   5/8/2019  8:00 AM RONAN Martinez   5/10/2019  8:00 AM RONAN Martinez   5/15/2019  8:00 AM RONAN Martinez   5/17/2019  8:00 AM RONAN Chavez

## 2019-04-29 ENCOUNTER — HOSPITAL ENCOUNTER (OUTPATIENT)
Dept: PHYSICAL THERAPY | Age: 64
Discharge: HOME | End: 2019-04-29
Payer: COMMERCIAL

## 2019-04-29 PROCEDURE — 97140 MANUAL THERAPY 1/> REGIONS: CPT

## 2019-04-29 NOTE — PROGRESS NOTES
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 Patient reports no increased soreness or pain in R shoulder without R UE sling. Pain: Initial:   5/10. Post Session: no change in pain reported at end of session   Medications Last Reviewed:  2019  Updated Objective Findings: See evaluation note from today   TREATMENT:     THERAPEUTIC EXERCISE: (0 minutes):  Exercises per grid below to improve mobility and strength. Date:  4-10-19 Date:  19 Date:  19 Date:     Activity/Exercise Parameters Parameters Parameters    Isometric - shoulder flexion  10 x 3 sec hold  3 second hold x 10 reps 3 second hold x 10 reps    Isometric - shoulder extension  10 x 3 sec hold  3 second hold x 10 reps 3 second hold x 10 reps                                         MANUAL THERAPY: (35 minutes): Joint mobilization and Soft tissue mobilization was utilized and necessary because of the patient's restricted joint motion, painful spasm, loss of articular motion and restricted motion of soft tissue.  With pt in supported in supine position, performed PROM to R shoulder in multiple planes of motion (flexion, abduction, ER at 15, 45, and 90 degrees abduction) within comfortable range followed by STM to right biceps musculature, deltoid, upper trap, and pectoralis major and minor with cross friction massage at R shoulder incision to help decrease tightness, break up scar adhesions and increase blood flow. Also performed intermittent inferior distraction mobilizations at R glenohumeral joint (grade 2-3) to reduce muscular tightness and pain. MODALITIES: (10 minutes): With pt in supine with knee wedge in place cold pack applied to R shoulder to reduce pain and inflammation at end of session. Skin clear and intact. Treatment/Session Summary:    · Response to Treatment:  Patient making steady progress with PROM of R shoulder. Muscle guarding/tightness decreased in R shoulder/cervical musculature following manual therapy techniques. No adverse reactions/unusual changes observed/reported in clinical status this session.   · Communication/Consultation:  None today  · Equipment provided today:  None today  · Recommendations/Intent for next treatment session: Next visit will focus on increasing R shoulder mobility, working on shoulder isometrics to increase strength gently  Treatment Plan of Care Effective Dates:  4/3/2019 to 6/2/2019  Total Treatment Billable Duration:  35 minutes  PT Patient Time In/Time Out  Time In: 0800  Time Out: 120 [facility] Appointments   Date Time Provider Patrice Salter   5/1/2019  8:00 AM Will RONAN Baron   5/8/2019  8:00 AM Will RONAN Baron   5/10/2019  8:00 AM Will RONAN Baron   5/15/2019  8:00 AM Will RONAN Baron   5/17/2019  8:00 AM RONAN [facility]

## 2019-05-01 ENCOUNTER — HOSPITAL ENCOUNTER (OUTPATIENT)
Dept: PHYSICAL THERAPY | Age: 64
Discharge: HOME | End: 2019-05-01
Payer: COMMERCIAL

## 2019-05-01 PROCEDURE — 97140 MANUAL THERAPY 1/> REGIONS: CPT

## 2019-05-01 PROCEDURE — 97110 THERAPEUTIC EXERCISES: CPT

## 2019-05-01 NOTE — PROGRESS NOTES
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 Post Session: 5/10   Medications Last Reviewed:  2019  Updated Objective Findings: None Today   TREATMENT:     THERAPEUTIC EXERCISE: (17 minutes):  Exercises per grid below to improve mobility and strength.      Date:  4-10-19 Date:  19 Date:  19 Date:  2019   Activity/Exercise Parameters Parameters Parameters    Isometric - shoulder flexion  10 x 3 sec hold  3 second hold x 10 reps 3 second hold x 10 reps 3-5 second hold x 10 reps against therapist resistance in supine position   Isometric - shoulder extension  10 x 3 sec hold  3 second hold x 10 reps 3 second hold x 10 reps 3-5 second hold x 10 reps against therapist resistance in supine position   Isometric - shoulder ER/IR    3-5 second hold x 10 reps each direction against therapist resistance in supine position   Isometric - shoulder abduction/adduction    3-5 second hold x 10 reps each direction against therapist resistance in supine position   Pulleys    20 reps/1 set flexion/scaption with cues for relaxation throughout                   MANUAL THERAPY: (23 minutes): Joint mobilization and Soft tissue mobilization was utilized and necessary because of the patient's restricted joint motion, painful spasm, loss of articular motion and restricted motion of soft tissue. With pt in supported in supine position, performed PROM to R shoulder in multiple planes of motion (flexion, abduction, ER at 15 and 45 abduction) within comfortable range followed by STM to right biceps musculature, deltoid, upper trap, and pectoralis major and minor with cross friction massage at R shoulder incision to help decrease tightness, break up scar adhesions and increase blood flow. Also performed intermittent inferior distraction mobilizations at R glenohumeral joint (grade 2-3) to reduce muscular tightness and pain. MODALITIES: (10 minutes): With pt in sitting, cold pack applied to R shoulder to reduce pain and inflammation at end of session. Skin clear and intact. Treatment/Session Summary:    · Response to Treatment:  Patient continuing to demonstrate increased R shoulder mobility and reports less pain overall, although did report slight increase in pain after exercises this session. Encouraged pt to continue icing at home. No adverse reactions/unusual changes observed/reported in clinical status this session.   · Communication/Consultation:  None today  · Equipment provided today:  None today  · Recommendations/Intent for next treatment session: Next visit will focus on increasing R shoulder mobility, working on shoulder isometrics to increase strength gently  Treatment Plan of Care Effective Dates:  4/3/2019 to 6/2/2019  Total Treatment Billable Duration:  40 minutes  PT Patient Time In/Time Out  Time In: 0804  Time Out: [address], COLLINST    Future Appointments   Date Time Provider Patrice Salter   5/8/2019  8:00 AM RONAN Arizmendi   5/10/2019  8:00 AM RONAN Arizmendi   5/15/2019  8:00 AM RONAN Arizmendi   5/17/2019  8:00 AM RONAN Ibarra

## 2019-05-08 ENCOUNTER — HOSPITAL ENCOUNTER (OUTPATIENT)
Dept: PHYSICAL THERAPY | Age: 64
Discharge: HOME | End: 2019-05-08
Payer: COMMERCIAL

## 2019-05-08 PROCEDURE — 97110 THERAPEUTIC EXERCISES: CPT

## 2019-05-08 PROCEDURE — 97140 MANUAL THERAPY 1/> REGIONS: CPT

## 2019-05-08 NOTE — PROGRESS NOTES
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 Post Session: 3/10   Medications Last Reviewed:  2019  Updated Objective Findings: None Today   TREATMENT:     THERAPEUTIC EXERCISE: (29 minutes):  Exercises per grid below to improve mobility and strength.      Date:  4-10-19 Date:  19 Date:  19 Date:  2019 Date:  2019   Activity/Exercise Parameters Parameters Parameters     Isometric - shoulder flexion  10 x 3 sec hold  3 second hold x 10 reps 3 second hold x 10 reps 3-5 second hold x 10 reps against therapist resistance in supine position Pink theratubing resistance; 10 reps/1 set walking forward; cues for correct technique   Isometric - shoulder extension  10 x 3 sec hold  3 second hold x 10 reps 3 second hold x 10 reps 3-5 second hold x 10 reps against therapist resistance in supine position Pink theratubing resistance; 10 reps/1 set walking backward; cues for correct technique   Isometric - shoulder ER/IR    3-5 second hold x 10 reps each direction against therapist resistance in supine position Pink theratubing resistance; 10 reps/1 set R UE walking side to side facing each direction; cues for correct technique   Isometric - shoulder abduction/adduction    3-5 second hold x 10 reps each direction against therapist resistance in supine position    Pulleys    20 reps/1 set flexion/scaption with cues for relaxation throughout 20 reps/1 set flexion/scaption with cues for relaxation throughout   UBE      L1 resistance for 4 minutes forward and 4 minutes backward             MANUAL THERAPY: (11 minutes): Joint mobilization and Soft tissue mobilization was utilized and necessary because of the patient's restricted joint motion, painful spasm, loss of articular motion and restricted motion of soft tissue. With pt in supported in supine position, performed PROM to R shoulder in multiple planes of motion (flexion, abduction, ER at 15 and 45 degrees abduction) within comfortable range followed by STM to right biceps musculature, deltoid, upper trap, and pectoralis major and minor with cross friction massage at R shoulder incision to help decrease tightness, break up scar adhesions and increase blood flow. MODALITIES: (10 minutes): With pt in supported supine position, cold pack applied to R shoulder to reduce pain and inflammation at end of session. Skin clear and intact. Treatment/Session Summary:    · Response to Treatment:  Patient reporting less tightness and improved pain in his R shoulder this session. He was able to perform new exercises with no reports of increased pain. No adverse reactions/unusual changes observed/reported in clinical status this session.   · Communication/Consultation:  None today  · Equipment provided today:  None today  · Recommendations/Intent for next treatment session: Next visit will focus on increasing R shoulder mobility, working on shoulder isometrics to increase strength gently  Treatment Plan of Care Effective Dates:  4/3/2019 to 6/2/2019  Total Treatment Billable Duration:  40 minutes  PT Patient Time In/Time Out  Time In: 0800  Time Out: Adore Hartley DPT    Future Appointments   Date Time Provider Patrice Salter   5/10/2019  8:00 AM Geo ANTNOIO DPT [facility]   5/15/2019  8:00 AM Kathie An DPT [facility] 5/17/2019  8:00 AM Marty Forrester DPT [facility]

## 2019-05-10 ENCOUNTER — HOSPITAL ENCOUNTER (OUTPATIENT)
Dept: PHYSICAL THERAPY | Age: 64
Discharge: HOME | End: 2019-05-10
Payer: COMMERCIAL

## 2019-05-10 PROCEDURE — 97140 MANUAL THERAPY 1/> REGIONS: CPT

## 2019-05-10 PROCEDURE — 97110 THERAPEUTIC EXERCISES: CPT

## 2019-05-10 NOTE — PROGRESS NOTES
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 States he had pain afterward in his shoulder, but he iced it and the pain went down again  Pain: Initial:   3/10. Post Session: 3/10   Medications Last Reviewed:  5/10/2019  Updated Objective Findings: None Today   TREATMENT:     THERAPEUTIC EXERCISE: (30 minutes):  Exercises per grid below to improve mobility and strength.      Date:  19 Date:  19 Date:  2019 Date:  2019 Date:  5/10/2019   Activity/Exercise Parameters Parameters      Isometric - shoulder flexion  3 second hold x 10 reps 3 second hold x 10 reps 3-5 second hold x 10 reps against therapist resistance in supine position Pink theratubing resistance; 10 reps/1 set walking forward; cues for correct technique Yellow theraband resistance; 12 reps/1 set walking forward; cues for correct technique   Isometric - shoulder extension  3 second hold x 10 reps 3 second hold x 10 reps 3-5 second hold x 10 reps against therapist resistance in supine position Pink theratubing resistance; 10 reps/1 set walking backward; cues for correct technique Yellow theraband resistance; 12 reps/1 set walking backward; cues for correct technique   Isometric - shoulder ER/IR   3-5 second hold x 10 reps each direction against therapist resistance in supine position Pink theratubing resistance; 10 reps/1 set R UE walking side to side facing each direction; cues for correct technique Yellow theraband resistance; 12 reps/1 set R UE walking side to side facing each direction; cues for correct technique   Isometric - shoulder abduction/adduction   3-5 second hold x 10 reps each direction against therapist resistance in supine position     Pulleys   20 reps/1 set flexion/scaption with cues for relaxation throughout 20 reps/1 set flexion/scaption with cues for relaxation throughout 20 reps/1 set flexion/scaption with cues for relaxation throughout   UBE     L1 resistance for 4 minutes forward and 4 minutes backward L1 resistance for 4 minutes forward and 4 minutes backward             MANUAL THERAPY: (10 minutes): Joint mobilization and Soft tissue mobilization was utilized and necessary because of the patient's restricted joint motion, painful spasm, loss of articular motion and restricted motion of soft tissue. With pt in supported in supine position, performed PROM to R shoulder in multiple planes of motion (flexion, abduction, ER at 15 and 45 degrees abduction) within comfortable range followed by STM to right biceps musculature, deltoid, upper trap, and pectoralis major and minor with cross friction massage at R shoulder incision to help decrease tightness, break up scar adhesions and increase blood flow. MODALITIES: (10 minutes): With pt in supported supine position, cold pack applied to R shoulder to reduce pain and inflammation at end of session. Skin clear and intact. Treatment/Session Summary:    · Response to Treatment:  Pt demonstrating improved R shoulder mobility and strength this session, progressing with exercises with no reports of increased pain. Stating no increased pain at end of session. No adverse reactions/unusual changes observed/reported in clinical status this session.   · Communication/Consultation: None today  · Equipment provided today:  None today  · Recommendations/Intent for next treatment session: Next visit will focus on increasing R shoulder mobility, working on shoulder isometrics to increase strength gently  Treatment Plan of Care Effective Dates:  4/3/2019 to 6/2/2019  Total Treatment Billable Duration:  40 minutes  PT Patient Time In/Time Out  Time In: 0805  Time Out: [address] [facility], DPT    Future Appointments   Date Time Provider Patrice Salter   5/15/2019  8:00 AM Marti ANTONIO DPT [facility]   5/17/2019  8:00 AM Arlyn Black DPT [facility]

## 2019-05-15 ENCOUNTER — HOSPITAL ENCOUNTER (OUTPATIENT)
Dept: PHYSICAL THERAPY | Age: 64
Discharge: HOME | End: 2019-05-15
Payer: COMMERCIAL

## 2019-05-15 PROCEDURE — 97140 MANUAL THERAPY 1/> REGIONS: CPT

## 2019-05-15 PROCEDURE — 97110 THERAPEUTIC EXERCISES: CPT

## 2019-05-15 NOTE — PROGRESS NOTES
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 Post Session: -3/10   Medications Last Reviewed:  5/15/2019  Updated Objective Findings: None Today   TREATMENT:     THERAPEUTIC EXERCISE: (45 minutes):  Exercises per grid below to improve mobility and strength.      Date:  19 Date:  2019 Date:  2019 Date:  5/10/2019 Date:  5/15/2019   Activity/Exercise Parameters       Isometric - shoulder flexion  3 second hold x 10 reps 3-5 second hold x 10 reps against therapist resistance in supine position Pink theratubing resistance; 10 reps/1 set walking forward; cues for correct technique Yellow theraband resistance; 12 reps/1 set walking forward; cues for correct technique    Isometric - shoulder extension  3 second hold x 10 reps 3-5 second hold x 10 reps against therapist resistance in supine position Pink theratubing resistance; 10 reps/1 set walking backward; cues for correct technique Yellow theraband resistance; 12 reps/1 set walking backward; cues for correct technique    Isometric - shoulder ER/IR  3-5 second hold x 10 reps each direction against therapist resistance in supine position Pink theratubing resistance; 10 reps/1 set R UE walking side to side facing each direction; cues for correct technique Yellow theraband resistance; 12 reps/1 set R UE walking side to side facing each direction; cues for correct technique    Isometric - shoulder abduction/adduction  3-5 second hold x 10 reps each direction against therapist resistance in supine position      Pulleys  20 reps/1 set flexion/scaption with cues for relaxation throughout 20 reps/1 set flexion/scaption with cues for relaxation throughout 20 reps/1 set flexion/scaption with cues for relaxation throughout 20 reps/1 set flexion/scaption with cues for relaxation throughout   UBE    L1 resistance for 4 minutes forward and 4 minutes backward L1 resistance for 4 minutes forward and 4 minutes backward L4 resistance for 4 minutes forward and 4 minutes backward   Standing shoulder ER/IR     Pink theratubing resistance; 20 reps/1 set R UE each direction with cues for upright posture and slow movement throughout; cues to avoid painful AROM   Standing shoulder rows     Pink theratubing resistance; 20 reps/1 set with cues for good technique and avoiding shoulder elevation   Standing shoulder extension     Pink theratubing resistance; 20 reps/1 set with cues for good technique   Standing shoulder scaption     No weight; 20 reps/1 set with cues for good technique   Hooklying chest press     No weight; 20 reps/1 set with cues for correct technique                     MANUAL THERAPY: (24 minutes): Joint mobilization and Soft tissue mobilization was utilized and necessary because of the patient's restricted joint motion, painful spasm, loss of articular motion and restricted motion of soft tissue.  With pt in supported in supine position, performed PROM to R shoulder in multiple planes of motion (flexion, abduction, ER at 15 and 45 degrees abduction) within comfortable range followed by STM to right biceps musculature, deltoid, upper trap, and pectoralis major and minor to help decrease tightness, break up scar adhesions and increase blood flow. MODALITIES: (10 minutes): With pt in supported supine position, cold pack applied to R shoulder to reduce pain and inflammation at end of session. Skin clear and intact. Treatment/Session Summary:    · Response to Treatment:  Pt demonstrating good ability to perform all new UE exercises with no reports of increased pain today. Will give pt HEP next session. No adverse reactions/unusual changes observed/reported in clinical status this session.   · Communication/Consultation:  None today  · Equipment provided today:  None today  · Recommendations/Intent for next treatment session: Next visit will focus on increasing R shoulder mobility, slowly progressing strengthening exercises as tolerated by pt  Treatment Plan of Care Effective Dates:  4/3/2019 to 6/2/2019  Total Treatment Billable Duration:  69 minutes  PT Patient Time In/Time Out  Time In: 0807  Time Out: [address], DPT    Future Appointments   Date Time Provider Patrice Salter   5/17/2019  8:00 AM RONAN Sullivan   5/22/2019  8:00 AM RONAN Sullivan   5/24/2019  8:00 AM RONAN Sullivan   5/29/2019  8:00 AM RONAN Sullivan   5/31/2019  8:00 AM RONAN Anthony [facility]

## 2019-05-17 ENCOUNTER — HOSPITAL ENCOUNTER (OUTPATIENT)
Dept: PHYSICAL THERAPY | Age: 64
Discharge: HOME | End: 2019-05-17
Payer: COMMERCIAL

## 2019-05-17 PROCEDURE — 97110 THERAPEUTIC EXERCISES: CPT

## 2019-05-17 NOTE — PROGRESS NOTES
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 Post Session: no reports of pain at end of session   Medications Last Reviewed:  2019  Updated Objective Findings: None Today   TREATMENT:     THERAPEUTIC EXERCISE: (48 minutes):  Exercises per grid below to improve mobility and strength.      Date:  2019 Date:  5/10/2019 Date:  5/15/2019 Date:  2091   Activity/Exercise       Isometric - shoulder flexion  Pink theratubing resistance; 10 reps/1 set walking forward; cues for correct technique Yellow theraband resistance; 12 reps/1 set walking forward; cues for correct technique     Isometric - shoulder extension  Pink theratubing resistance; 10 reps/1 set walking backward; cues for correct technique Yellow theraband resistance; 12 reps/1 set walking backward; cues for correct technique     Isometric - shoulder ER/IR Pink theratubing resistance; 10 reps/1 set R UE walking side to side facing each direction; cues for correct technique Yellow theraband resistance; 12 reps/1 set R UE walking side to side facing each direction; cues for correct technique     Isometric - shoulder abduction/adduction       Pulleys 20 reps/1 set flexion/scaption with cues for relaxation throughout 20 reps/1 set flexion/scaption with cues for relaxation throughout 20 reps/1 set flexion/scaption with cues for relaxation throughout 20 reps/1 set flexion/scaption with cues for relaxation throughout  10 reps/1 set into IR with cues to avoid range into discomfort/pain - encouraged pt to go slowly and carefully to protect graft   UBE  L1 resistance for 4 minutes forward and 4 minutes backward L1 resistance for 4 minutes forward and 4 minutes backward L4 resistance for 4 minutes forward and 4 minutes backward L4 resistance for 4 minutes forward and 4 minutes backward   Standing shoulder ER/IR*   Pink theratubing resistance; 20 reps/1 set R UE each direction with cues for upright posture and slow movement throughout; cues to avoid painful AROM Pink theratubing resistance for ER, yellow theraband resistance for IR; 20 reps/1 set R UE each direction with cues for upright posture and slow movement throughout; cues to avoid painful AROM   Standing shoulder rows*   Pink theratubing resistance; 20 reps/1 set with cues for good technique and avoiding shoulder elevation Yellow theraband resistance; 20 reps/1 set with cues for good technique and avoiding shoulder elevation   Standing shoulder extension*   Pink theratubing resistance; 20 reps/1 set with cues for good technique Yellow theratubing resistance; 20 reps/1 set with cues for good technique   Standing shoulder scaption*   No weight; 20 reps/1 set with cues for good technique No weight; 20 reps/1 set with cues for good technique   Hooklying chest press*   No weight; 20 reps/1 set with cues for correct technique No weight; 20 reps/1 set with cues for correct technique   Bicep curls    Holding 1 lb dumbbells; 20 reps/1 set with slow controlled movement   Tricep press    5 lb resistance at cable machine; 20 reps/1 set with slow controlled movement     MANUAL THERAPY: ()    MODALITIES: (10 minutes):  With pt in supported supine position, cold pack applied to R shoulder to reduce pain and inflammation at end of session. Skin clear and intact. Treatment/Session Summary:    · Response to Treatment:  Pt demonstrating good technique with all exercises this session with no reports of increased pain - gave him HEP for home. No adverse reactions/unusual changes observed/reported in clinical status this session.   · Communication/Consultation:  None today  · Equipment provided today:  None today  · Recommendations/Intent for next treatment session: Next visit will focus on increasing R shoulder mobility, slowly progressing strengthening exercises as tolerated by pt  Treatment Plan of Care Effective Dates:  4/3/2019 to 6/2/2019  Total Treatment Billable Duration:  48 minutes  PT Patient Time In/Time Out  Time In: 0801  Time Out: 0900  Michelle Muñoz DPT    Future Appointments   Date Time Provider Patrice Salter   5/22/2019  8:00 AM [place] B, RONAN Bhardwaj   5/24/2019  8:00 AM [place] B, DPCARLIN MARQUES   5/29/2019  8:00 AM [place] B, DPT DARIUS MARQUES   5/31/2019  8:00 AM COLLINS ConcepcionT NELADORPCARLIN Bhardwaj

## 2019-05-22 ENCOUNTER — HOSPITAL ENCOUNTER (OUTPATIENT)
Dept: PHYSICAL THERAPY | Age: 64
Discharge: HOME | End: 2019-05-22
Payer: COMMERCIAL

## 2019-05-22 PROCEDURE — 97110 THERAPEUTIC EXERCISES: CPT

## 2019-05-22 NOTE — PROGRESS NOTES
Christina Leggett  : 1955  Primary: [employer] [employer] [facility]  Secondary:  [facility] at 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 Post Session: no reports of pain at end of session   Medications Last Reviewed:  2019  Updated Objective Findings: None Today   TREATMENT:     THERAPEUTIC EXERCISE: (40 minutes):  Exercises per grid below to improve mobility and strength.      Date:  5/10/2019 Date:  5/15/2019 Date:  2091 Date:  2019   Activity/Exercise       Isometric - shoulder flexion  Yellow theraband resistance; 12 reps/1 set walking forward; cues for correct technique      Isometric - shoulder extension  Yellow theraband resistance; 12 reps/1 set walking backward; cues for correct technique      Isometric - shoulder ER/IR Yellow theraband resistance; 12 reps/1 set R UE walking side to side facing each direction; cues for correct technique      Isometric - shoulder abduction/adduction       Pulleys 20 reps/1 set flexion/scaption with cues for relaxation throughout 20 reps/1 set flexion/scaption with cues for relaxation throughout 20 reps/1 set flexion/scaption with cues for relaxation throughout  10 reps/1 set into IR with cues to avoid range into discomfort/pain - encouraged pt to go slowly and carefully to protect graft 20 reps/1 set flexion/scaption with cues for relaxation throughout  10 reps/1 set into IR with cues to avoid range into discomfort/pain - encouraged pt to go slowly and carefully to protect graft   UBE  L1 resistance for 4 minutes forward and 4 minutes backward L4 resistance for 4 minutes forward and 4 minutes backward L4 resistance for 4 minutes forward and 4 minutes backward L4 resistance for 4 minutes forward and 4 minutes backward   Standing shoulder ER/IR*  Pink theratubing resistance; 20 reps/1 set R UE each direction with cues for upright posture and slow movement throughout; cues to avoid painful AROM Pink theratubing resistance for ER, yellow theraband resistance for IR; 20 reps/1 set R UE each direction with cues for upright posture and slow movement throughout; cues to avoid painful AROM Yellow theraband resistance for ER, green theratubing resistance for IR; 20 reps/1 set R UE each direction with cues for upright posture and slow movement throughout; cues to avoid painful AROM   Standing shoulder rows*  Pink theratubing resistance; 20 reps/1 set with cues for good technique and avoiding shoulder elevation Yellow theraband resistance; 20 reps/1 set with cues for good technique and avoiding shoulder elevation 7.5 lb resistance at cable machine; 20 reps/1 set with cues for slow movement throughout   Standing shoulder extension*  Pink theratubing resistance; 20 reps/1 set with cues for good technique Yellow theratubing resistance; 20 reps/1 set with cues for good technique 7.5 lb resistance at cable machine; 20 reps/1 set with cues for slow movement   Standing shoulder scaption*  No weight; 20 reps/1 set with cues for good technique No weight; 20 reps/1 set with cues for good technique Holding 1 lb weight; 20 reps/1 set with cues for good technique   Hooklying chest press*  No weight; 20 reps/1 set with cues for correct technique No weight; 20 reps/1 set with cues for correct technique Holding 1 lb weight; 20 reps/1 set with cues for good technique   Bicep curls   Holding 1 lb dumbbells; 20 reps/1 set with slow controlled movement Holding 2 lb dumbbells; 20 reps/1 set with slow controlled movement   Tricep press   5 lb resistance at cable machine; 20 reps/1 set with slow controlled movement 7.5 lb resistance at cable machine; 20 reps/1 set with slow controlled movement     MANUAL THERAPY: ()    MODALITIES: () Pt states he will do at home    Treatment/Session Summary:    · Response to Treatment:  Pt demonstrating good ability to progress with all exercises this session as evident by his ability to perform with increased resistance and no reports of increased pain. No adverse reactions/unusual changes observed/reported in clinical status this session.   · Communication/Consultation:  None today  · Equipment provided today:  None today  · Recommendations/Intent for next treatment session: Next visit will focus on increasing R shoulder mobility, slowly progressing strengthening exercises as tolerated by pt  Treatment Plan of Care Effective Dates:  4/3/2019 to 6/2/2019  Total Treatment Billable Duration:  40 minutes  PT Patient Time In/Time Out  Time In: 0803  Time Out: Moshe Jj, RONAN    Future Appointments   Date Time Provider Patrice Salter   5/22/2019  2:30 PM KARI Loyola RFM RFM   5/24/2019  8:00 AM RONAN Maurice   5/29/2019  8:00 AM RONAN Maurice   5/31/2019  8:00 AM RONAN Tao [facility]

## 2019-05-24 ENCOUNTER — HOSPITAL ENCOUNTER (OUTPATIENT)
Dept: PHYSICAL THERAPY | Age: 64
Discharge: HOME | End: 2019-05-24
Payer: COMMERCIAL

## 2019-05-24 PROCEDURE — 97140 MANUAL THERAPY 1/> REGIONS: CPT

## 2019-05-24 PROCEDURE — 97110 THERAPEUTIC EXERCISES: CPT

## 2019-05-24 NOTE — PROGRESS NOTES
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 Post Session: no reports of pain at end of session   Medications Last Reviewed:  2019  Updated Objective Findings: See evaluation note from today   TREATMENT:     THERAPEUTIC EXERCISE: (32 minutes):  Exercises per grid below (and assessment in chart on 2019) to improve mobility and strength.      Date:  5/15/2019 Date:  2091 Date:  2019 Date:  2019   Activity/Exercise       Isometric - shoulder flexion        Isometric - shoulder extension        Isometric - shoulder ER/IR       Isometric - shoulder abduction/adduction       Pulleys 20 reps/1 set flexion/scaption with cues for relaxation throughout 20 reps/1 set flexion/scaption with cues for relaxation throughout  10 reps/1 set into IR with cues to avoid range into discomfort/pain - encouraged pt to go slowly and carefully to protect graft 20 reps/1 set flexion/scaption with cues for relaxation throughout  10 reps/1 set into IR with cues to avoid range into discomfort/pain - encouraged pt to go slowly and carefully to protect graft 20 reps/1 set flexion/scaption with cues for relaxation throughout  10 reps/1 set into IR with cues to avoid range into discomfort/pain - encouraged pt to go slowly and carefully to protect graft   UBE  L4 resistance for 4 minutes forward and 4 minutes backward L4 resistance for 4 minutes forward and 4 minutes backward L4 resistance for 4 minutes forward and 4 minutes backward L4 resistance for 4 minutes forward and 4 minutes backward   Standing shoulder ER/IR* Pink theratubing resistance; 20 reps/1 set R UE each direction with cues for upright posture and slow movement throughout; cues to avoid painful AROM Pink theratubing resistance for ER, yellow theraband resistance for IR; 20 reps/1 set R UE each direction with cues for upright posture and slow movement throughout; cues to avoid painful AROM Yellow theraband resistance for ER, green theratubing resistance for IR; 20 reps/1 set R UE each direction with cues for upright posture and slow movement throughout; cues to avoid painful AROM    Standing shoulder rows* Pink theratubing resistance; 20 reps/1 set with cues for good technique and avoiding shoulder elevation Yellow theraband resistance; 20 reps/1 set with cues for good technique and avoiding shoulder elevation 7.5 lb resistance at cable machine; 20 reps/1 set with cues for slow movement throughout 7.5 lb resistance at cable machine; 20 reps/1 set with cues for slow movement throughout   Standing shoulder extension* Pink theratubing resistance; 20 reps/1 set with cues for good technique Yellow theratubing resistance; 20 reps/1 set with cues for good technique 7.5 lb resistance at cable machine; 20 reps/1 set with cues for slow movement    Standing shoulder scaption* No weight; 20 reps/1 set with cues for good technique No weight; 20 reps/1 set with cues for good technique Holding 1 lb weight; 20 reps/1 set with cues for good technique    Hooklying chest press* No weight; 20 reps/1 set with cues for correct technique No weight; 20 reps/1 set with cues for correct technique Holding 1 lb weight; 20 reps/1 set with cues for good technique    Bicep curls  Holding 1 lb dumbbells; 20 reps/1 set with slow controlled movement Holding 2 lb dumbbells; 20 reps/1 set with slow controlled movement    Tricep press  5 lb resistance at cable machine; 20 reps/1 set with slow controlled movement 7.5 lb resistance at cable machine; 20 reps/1 set with slow controlled movement    Varying resistance at R UE in different planes of motion    Per strength assessment in chart   R shoulder AROM    Per ROM assessment in chart     MANUAL THERAPY: (8 minutes) With pt in supported supine position, passive ROM to R shoulder in multiple planes of motion (flexion, abduction, ER at 15, 45, and 90 degrees abduction) followed by intermittent inferior distraction mobilizations to R glenohumeral joint to reduce muscular tightness and pain and improve R shoulder mobility. MODALITIES: () Pt states he will do at home    Treatment/Session Summary:    · Response to Treatment:  See assessment in chart        No adverse reactions/unusual changes observed/reported in clinical status this session.   · Communication/Consultation:  None today  · Equipment provided today:  None today  · Recommendations/Intent for next treatment session: Next visit will focus on increasing R shoulder mobility and developing shoulder strengthening HEP  Treatment Plan of Care Effective Dates:  5/24/2019 to 7/1/2019  Total Treatment Billable Duration:  40 minutes  PT Patient Time In/Time Out  Time In: 0802  Time Out: 1353 [facility], CARLIN    Future Appointments   Date Time Provider Patrice Salter   5/29/2019  8:00 AM [place] B, DPT [facility] SHELLI   5/31/2019  8:00 AM COLLINS BoykinT [facility] Rayne Saravia

## 2019-05-24 NOTE — PROGRESS NOTES
Idalia Fermin  : 1955  Primary: [facility]*  Secondary:  [facility], [address] [facility], [address]  [phone]   [phone]       OUTPATIENT PHYSICAL THERAPY:Recertification     ICD-10: Treatment Diagnosis: Pain in right shoulder (M25.511)    Stiffness of right shoulder, not elsewhere classified (M25.611)   Precautions/Allergies:   Patient has no known allergies. 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 This session, pt demonstrated improved R UE strength/endurance, improved R shoulder mobility, less postural deficits, and improved functional mobility as evident by a score of 40/55 on the Disabilities of the Arm, Shoulder, and Hand Questionnaire (initial score of 54/55, with higher scores indicating increased disability). Despite the above listed improvements, pt continues to demonstrate decreased R UE strength/endurance, decreased right shoulder mobility with associated pain, multiple postural deficits and decreased functional mobility. At baseline, pt is  and has to lift/push/pull heavy weight on a regular basis.  Pt may continue to benefit from skilled PT to address the above listed deficits to improve ability to perform pain-free ADLs/IADLs and to improve overall quality of life prior to discharge - will extend his plan of care by about 4 weeks, but reduce frequency to one time a week after next week to focus on HEP and develop strength further. PROBLEM LIST (Impacting functional limitations):  1. Decreased Strength  2. Decreased ADL/Functional Activities  3. Increased Pain  4. Decreased Activity Tolerance  5. Decreased Work Simplification/Energy Conservation Techniques  6. Increased Fatigue  7. Decreased Flexibility/Joint Mobility  8. Edema/Girth INTERVENTIONS PLANNED: (Treatment may consist of any combination of the following)  1. Bed Mobility  2. Cold  3. Electrical Stimulation  4. Heat  5. Home Exercise Program (HEP)  6. Manual Therapy  7. Neuromuscular Re-education/Strengthening  8. Range of Motion (ROM)  9. Therapeutic Activites  10. Therapeutic Exercise/Strengthening  11. Transcutaneous Electrical Nerve Stimulation (TENS)  12. Ultrasound (US)   TREATMENT PLAN:  Effective Dates/Frequency/Duration: Twice per week from 5/24/2019 until 6/2/2019, followed by once per week from 6/3/2019 until 7/1/2019 (5 weeks). GOALS: (Goals have been discussed and agreed upon with patient.)  Short-Term Functional Goals: Time Frame: 4 weeks  1. Pt will be compliant with HEP in order to increase UE strength/endurance/mobility to improve functional mobility and overall quality of life. (MET , 4/26/2019)   2. Pt will improve score on the Disabilities of the Arm, Shoulder, and Hand (DASH) Questionnaire to 44/55 in order to improve independence with ADLs and IADLs and to improve overall quality of life. (MET , 5/24/2019)   3. Pt will increase right shoulder PROM to 150 degrees with minimal to no increase in pain in order to improve functional mobility and ability to reach up. (PROGRESSING , 5/24/2019)   4. Pt will be able to demonstrate good body mechanics while lifting 5 lb object up from the floor in order to minimize pain while lifting heavier items off of floor. (PROGRESSING , 5/24/2019)   Discharge Goals: Time Frame: 8 weeks  1.  Pt will be independent with HEP in order to increase UE strength/endurance/mobility to improve functional mobility and overall quality of life. (PROGRESSING , 5/24/2019)   2. Pt will improve score on the Disabilities of the Arm, Shoulder, and Hand (DASH) Questionnaire to 34/55 in order to improve independence with ADLs and IADLs and to improve overall quality of life. (PROGRESSING , 5/24/2019)    3. Pt will increase right shoulder flexion AROM to 120 degrees with minimal to no increase in pain in order to improve functional mobility and ability to reach up. (PROGRESSING , 5/24/2019)   4. Pt will be able to demonstrate good body mechanics while lifting 10 lb object up from the floor in order to minimize pain while performing job duties. (PROGRESSING , 5/24/2019)     CLINICAL DECISION MAKING:   OUTCOME MEASURE:   Tool Used: Disabilities of the Arm, Shoulder and Hand (DASH) Questionnaire - Quick Version  Score:  Initial: 54/55  Most Recent: 40/55 (Date: 5/24/2019 )   Interpretation of Score: The DASH is designed to measure the activities of daily living in person's with upper extremity dysfunction or pain. Each section is scored on a 1-5 scale, 5 representing the greatest disability. The scores of each section are added together for a total score of 55.       UPDATED OBJECTIVE FINDINGS:    Observation/Orthostatic Postural Assessment:    The following postural deficits were noted in sitting: loss of cervical lordosis, increased thoracic kyphosis, forward head, rounded shoulders and posterior pelvic tilt; pt not wearing sling on R UE and is cradling it - pt with improved upright posture on 5/24/2019 with no sling  The following postural deficits were noted in standing: slight forward trunk flexion  - slightly improved upright posture on 5/24/2019    ROM:    Initial measurement: (on 4/3/2019) Initial measurement: (on 4/3/2019) Reassessment measurement: (on 4/26/2019) Reassessment measurement: (on 5/24/2019)   L shoulder AROM:     Shoulder flexion: 162 degrees     Shoulder abduction: 165 degrees     Shoulder extension: 84 degrees     Shoulder ER: 72 degrees     Shoulder IR: level of T6    R UE AROM: NT on 4/3/2019  R UE PROM:     Shoulder flexion: 132 degrees (pain in superior shoulder)     Shoulder abduction: 108 degrees (pain in superior shoulder)     Shoulder ER at 15 degrees abduction: 71 degrees     Shoulder ER at 45 degrees abduction: 75 degrees      Shoulder ER at 90 degrees abduction: 71 degrees   R UE AROM:      Shoulder flexion: 108 degrees (pain in superior shoulder - cues to avoid overhead)     Shoulder abduction: 85 degrees (before significant shoulder elevation - pain in superior shoulder)     Shoulder extension: 52 degrees (before significant compensation)     Shoulder ER: 50 degrees     Shoulder IR: NT due to precautions  R UE PROM:     Shoulder flexion: 134 degrees (to discomfort - pain in superior shoulder)     Shoulder abduction: 109 degrees (to discomfort - pain in superior shoulder)     Shoulder ER at 15 degrees abduction: 64 degrees     Shoulder ER at 45 degrees abduction: 75 degrees      Shoulder ER at 90 degrees abduction: 76 degrees   R UE AROM:      Shoulder flexion: 135 degrees (pain in superior shoulde - cues to avoid overhead)     Shoulder abduction: 118 degrees (before significant shoulder elevation - pain in superior shoulder)     Shoulder extension: 65 degrees (before significant compensation)     Shoulder ER: 61 degrees     Shoulder IR: level of T10  R UE PROM:     Shoulder flexion: 144 degrees (to significant discomfort - pain deep in superior shoulder)     Shoulder abduction: 162 degrees (to significant discomfort - deep pain in superior shoulder)     Shoulder ER at 15 degrees abduction: 66 degrees     Shoulder ER at 45 degrees abduction: 75 degrees      Shoulder ER at 90 degrees abduction: 70 degrees     Strength:    Initial measurement: (on 4/3/2019) Initial measurement: (on 4/3/2019)  Reassessment  (on 4/26/2019) Reassessment:  (on 5/24/2019)    L UE: R UE: R UE: (no resistance applied due to precautions) R UE: (* = minimal resistance applied due to precautions)   Shoulder flexion  4/5  NT  3-/5 4-/5*   Shoulder extension 5/5  NT  3-/5 4+/5   Shoulder abduction 4/5  NT  3-/5 4-/5*   Shoulder adduction 5/5  NT  3/5 4+/5   Shoulder IR  5/5  NT  3/5 4-/5*   Shoulder ER 5/5  NT  3-/5 3+/5*   Elbow flexion 5/5  NT  3/5 4-/5*   Elbow extension 5/5  NT  3/5 4+/5       MEDICAL NECESSITY:   · Patient is expected to demonstrate progress in strength, range of motion and functional technique to improve ability to perform pain-free ADLs/IADLs and to improve overall quality of life. REASON FOR SERVICES/OTHER COMMENTS:  · Patient continues to require modification of therapeutic interventions to increase complexity of exercises. Use of outcome tool(s) and clinical judgement create a POC that gives a: Clear prediction of patient's progress: LOW COMPLEXITY      Total Duration:  PT Patient Time In/Time Out  Time In: 0802  Time Out: 5403    Rehabilitation Potential For Stated Goals: Good  Regarding Carla Durán's therapy, I certify that the treatment plan above will be carried out by a therapist or under their direction.   Thank you for this referral,  Mu Carmen DPT     Referring Physician Signature: Pricila De Santiago MD _______________________________ Date _____________

## 2019-05-29 ENCOUNTER — HOSPITAL ENCOUNTER (OUTPATIENT)
Dept: PHYSICAL THERAPY | Age: 64
Discharge: HOME | End: 2019-05-29
Payer: COMMERCIAL

## 2019-05-29 PROCEDURE — 97110 THERAPEUTIC EXERCISES: CPT

## 2019-05-29 NOTE — PROGRESS NOTES
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 Post Session: no reports of pain at end of session   Medications Last Reviewed:  2019  Updated Objective Findings: None Today   TREATMENT:     THERAPEUTIC EXERCISE: (40 minutes):  Exercises per grid below to improve mobility and strength.      Date:  2091 Date:  2019 Date:  2019 Date:  2019   Activity/Exercise       Isometric - shoulder flexion        Isometric - shoulder extension        Isometric - shoulder ER/IR       Isometric - shoulder abduction/adduction       Pulleys 20 reps/1 set flexion/scaption with cues for relaxation throughout  10 reps/1 set into IR with cues to avoid range into discomfort/pain - encouraged pt to go slowly and carefully to protect graft 20 reps/1 set flexion/scaption with cues for relaxation throughout  10 reps/1 set into IR with cues to avoid range into discomfort/pain - encouraged pt to go slowly and carefully to protect graft 20 reps/1 set flexion/scaption with cues for relaxation throughout  10 reps/1 set into IR with cues to avoid range into discomfort/pain - encouraged pt to go slowly and carefully to protect graft 20 reps/1 set flexion/scaption   20 reps/1 set into IR with cues to avoid range into discomfort/pain - encouraged pt to go slowly and carefully to protect    UBE  L4 resistance for 4 minutes forward and 4 minutes backward L4 resistance for 4 minutes forward and 4 minutes backward L4 resistance for 4 minutes forward and 4 minutes backward L5 resistance for 4 minutes forward and 4 minutes backward   Standing shoulder ER/IR* Pink theratubing resistance for ER, yellow theraband resistance for IR; 20 reps/1 set R UE each direction with cues for upright posture and slow movement throughout; cues to avoid painful AROM Yellow theraband resistance for ER, green theratubing resistance for IR; 20 reps/1 set R UE each direction with cues for upright posture and slow movement throughout; cues to avoid painful AROM  Yellow theraband resistance for ER, green theratubing resistance for IR; 20 reps/1 set R UE each direction with cues for upright posture and slow movement throughout; cues to avoid painful AROM   Standing shoulder rows* Yellow theraband resistance; 20 reps/1 set with cues for good technique and avoiding shoulder elevation 7.5 lb resistance at cable machine; 20 reps/1 set with cues for slow movement throughout 7.5 lb resistance at cable machine; 20 reps/1 set with cues for slow movement throughout 10 lb resistance at cable machine; 20 reps/1 set with cues for slow movement throughout   Standing shoulder extension* Yellow theratubing resistance; 20 reps/1 set with cues for good technique 7.5 lb resistance at cable machine; 20 reps/1 set with cues for slow movement  10 lb resistance at cable machine; 20 reps/1 set with cues for slow movement   Standing shoulder scaption* No weight; 20 reps/1 set with cues for good technique Holding 1 lb weight; 20 reps/1 set with cues for good technique  Holding 2 lb weight; 20 reps/1 set with cues for good technique   Hooklying chest press* No weight; 20 reps/1 set with cues for correct technique Holding 1 lb weight; 20 reps/1 set with cues for good technique  Holding 2 lb dumbbells; 20 reps/1 set with cues for good technique   Bicep curls Holding 1 lb dumbbells; 20 reps/1 set with slow controlled movement Holding 2 lb dumbbells; 20 reps/1 set with slow controlled movement  Holding 3 lb dumbbells; 20 reps/1 set with slow controlled movement   Tricep press 5 lb resistance at cable machine; 20 reps/1 set with slow controlled movement 7.5 lb resistance at cable machine; 20 reps/1 set with slow controlled movement  10 lb resistance at cable machine; 20 reps/1 set with slow controlled movement   Varying resistance at R UE in different planes of motion   Per strength assessment in chart    R shoulder AROM   Per ROM assessment in chart      MANUAL THERAPY: ()    MODALITIES: () Pt states he will do at home    Treatment/Session Summary:    · Response to Treatment:  Pt able to progress most exercises this session with no reports of increased pain. Will continue to progress strengthening exercises as tolerated by pt. No adverse reactions/unusual changes observed/reported in clinical status this session.   · Communication/Consultation:  None today  · Equipment provided today:  None today  · Recommendations/Intent for next treatment session: Next visit will focus on increasing R shoulder mobility and developing shoulder strengthening HEP  Treatment Plan of Care Effective Dates:  5/24/2019 to 7/1/2019  Total Treatment Billable Duration:  40 minutes  PT Patient Time In/Time Out  Time In: 0803  [facility]: [facility]    Future Appointments   Date Time Provider Patrice Salter   5/31/2019  8:00 AM [facility]   6/5/2019  8:45 AM RONAN French [facility]   6/12/2019  8:00 AM RONAN French [facility]   6/19/2019  8:00 AM RONAN French   6/26/2019  8:00 AM RONAN Crenshaw [facility]

## 2019-05-31 ENCOUNTER — HOSPITAL ENCOUNTER (OUTPATIENT)
Dept: PHYSICAL THERAPY | Age: 64
Discharge: HOME | End: 2019-05-31
Payer: COMMERCIAL

## 2019-05-31 PROCEDURE — 97110 THERAPEUTIC EXERCISES: CPT

## 2019-05-31 NOTE — PROGRESS NOTES
Jacquelin Done  : 1955  Primary: Samir Hamilton [facility]*  Secondary:  [facility] at 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 Post Session: no reports of pain at end of session   Medications Last Reviewed:  2019  Updated Objective Findings: None Today   TREATMENT:     THERAPEUTIC EXERCISE: (40 minutes):  Exercises per grid below to improve mobility and strength.      Date:  2019 Date:  2019 Date:  2019 Date:  2019   Activity/Exercise       Isometric - shoulder flexion        Isometric - shoulder extension        Isometric - shoulder ER/IR       Isometric - shoulder abduction/adduction       Pulleys 20 reps/1 set flexion/scaption with cues for relaxation throughout  10 reps/1 set into IR with cues to avoid range into discomfort/pain - encouraged pt to go slowly and carefully to protect graft 20 reps/1 set flexion/scaption with cues for relaxation throughout  10 reps/1 set into IR with cues to avoid range into discomfort/pain - encouraged pt to go slowly and carefully to protect graft 20 reps/1 set flexion/scaption   20 reps/1 set into IR with cues to avoid range into discomfort/pain - encouraged pt to go slowly and carefully to protect  20 reps/1 set flexion/scaption   20 reps/1 set into IR with cues to avoid range into discomfort/pain - encouraged pt to go slowly and carefully to protect UBE  L4 resistance for 4 minutes forward and 4 minutes backward L4 resistance for 4 minutes forward and 4 minutes backward L5 resistance for 4 minutes forward and 4 minutes backward L5 resistance for 4 minutes forward and 4 minutes backward   Standing shoulder ER/IR* Yellow theraband resistance for ER, green theratubing resistance for IR; 20 reps/1 set R UE each direction with cues for upright posture and slow movement throughout; cues to avoid painful AROM  Yellow theraband resistance for ER, green theratubing resistance for IR; 20 reps/1 set R UE each direction with cues for upright posture and slow movement throughout; cues to avoid painful AROM    Standing shoulder rows* 7.5 lb resistance at cable machine; 20 reps/1 set with cues for slow movement throughout 7.5 lb resistance at cable machine; 20 reps/1 set with cues for slow movement throughout 10 lb resistance at cable machine; 20 reps/1 set with cues for slow movement throughout 10 lb resistance at cable machine; 25 reps/1 set with cues for slow movement throughout   Standing shoulder extension* 7.5 lb resistance at cable machine; 20 reps/1 set with cues for slow movement  10 lb resistance at cable machine; 20 reps/1 set with cues for slow movement 10 lb resistance at cable machine; 25 reps/1 set with cues for slow movement   Standing shoulder scaption* Holding 1 lb weight; 20 reps/1 set with cues for good technique  Holding 2 lb weight; 20 reps/1 set with cues for good technique Holding 3 lb weight; 20 reps/1 set with cues for good technique   Hooklying chest press* Holding 1 lb weight; 20 reps/1 set with cues for good technique  Holding 2 lb dumbbells; 20 reps/1 set with cues for good technique Holding 3 lb dumbbells; 20 reps/1 set with cues for good technique   Bicep curls Holding 2 lb dumbbells; 20 reps/1 set with slow controlled movement  Holding 3 lb dumbbells; 20 reps/1 set with slow controlled movement Holding 4 lb dumbbells; 20 reps/1 set with slow controlled movement   Tricep press 7.5 lb resistance at cable machine; 20 reps/1 set with slow controlled movement  10 lb resistance at cable machine; 20 reps/1 set with slow controlled movement    Varying resistance at R UE in different planes of motion  Per strength assessment in chart     R shoulder AROM  Per ROM assessment in chart     Standing shoulder abduction at wall    20 reps/1 set with verbal/visual cues to avoid going below 90 degrees of shoulder abduction   Shoulder press ups    No weight; 10 reps/1 set B UEs with cues for avoiding R shoulder elevation - pt stating slight discomfort in R shoulder so discontinued     MANUAL THERAPY: ()    MODALITIES: () Pt states he will do at home    Treatment/Session Summary:    · Response to Treatment:  Pt demonstrating good ability to progress with most shoulder exercises with no reports of increased pain this session, but had abnormal movement pattern with shoulder press ups due to limited end-range shoulder abduction. Instructed pt to work on shoulder abduction wall slides and holds at home to improve his motion at end range. No adverse reactions/unusual changes observed/reported in clinical status this session.   · Communication/Consultation:  None today  · Equipment provided today:  None today  · Recommendations/Intent for next treatment session: Next visit will focus on increasing R shoulder mobility and developing shoulder strengthening HEP  Treatment Plan of Care Effective Dates:  5/24/2019 to 7/1/2019  Total Treatment Billable Duration:  40 minutes  PT Patient Time In/Time Out  Time In: 0802  Time Out: [address], DPT    Future Appointments   Date Time Provider Patrice Salter   6/5/2019  8:45 AM Harper Mortimer B, DPT [facility]   6/12/2019  8:00 AM Harper Mortimer B, DPT [facility]   6/19/2019  8:00 AM Harper Mortimer B, DPT [facility]   6/26/2019  8:00 AM Joycelyn Dexter DPT [facility]

## 2019-06-05 ENCOUNTER — HOSPITAL ENCOUNTER (OUTPATIENT)
Dept: PHYSICAL THERAPY | Age: 64
Discharge: HOME | End: 2019-06-05
Payer: COMMERCIAL

## 2019-06-05 PROCEDURE — 97110 THERAPEUTIC EXERCISES: CPT

## 2019-06-05 NOTE — PROGRESS NOTES
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 Post Session: 1/10   Medications Last Reviewed:  2019  Updated Objective Findings: None Today   TREATMENT:     THERAPEUTIC EXERCISE: (53 minutes):  Exercises per grid below to improve mobility and strength.      Date:  2019 Date:  2019 Date:  2019 Date:  2019   Activity/Exercise       Isometric - shoulder flexion        Isometric - shoulder extension        Isometric - shoulder ER/IR       Isometric - shoulder abduction/adduction       Pulleys 20 reps/1 set flexion/scaption with cues for relaxation throughout  10 reps/1 set into IR with cues to avoid range into discomfort/pain - encouraged pt to go slowly and carefully to protect graft 20 reps/1 set flexion/scaption   20 reps/1 set into IR with cues to avoid range into discomfort/pain - encouraged pt to go slowly and carefully to protect  20 reps/1 set flexion/scaption   20 reps/1 set into IR with cues to avoid range into discomfort/pain - encouraged pt to go slowly and carefully to protect  20 reps/1 set flexion/scaption   20 reps/1 set into IR with cues to avoid range into discomfort/pain - encouraged pt to go slowly and carefully to protect graft   UBE  L4 resistance for 4 minutes forward and 4 minutes backward L5 resistance for 4 minutes forward and 4 minutes backward L5 resistance for 4 minutes forward and 4 minutes backward L5.5 resistance for 4 minutes forward and 4 minutes backward   Standing shoulder ER/IR*  Yellow theraband resistance for ER, green theratubing resistance for IR; 20 reps/1 set R UE each direction with cues for upright posture and slow movement throughout; cues to avoid painful AROM     Standing shoulder rows* 7.5 lb resistance at cable machine; 20 reps/1 set with cues for slow movement throughout 10 lb resistance at cable machine; 20 reps/1 set with cues for slow movement throughout 10 lb resistance at cable machine; 25 reps/1 set with cues for slow movement throughout 12.5 lb resistance at cable machine; 20 reps/1 set with cues for slow movement throughout, initial cues for correct technique due to pt being distracted   Standing shoulder extension*  10 lb resistance at cable machine; 20 reps/1 set with cues for slow movement 10 lb resistance at cable machine; 25 reps/1 set with cues for slow movement 12.5 lb resistance at cable machine; 20 reps/1 set with cues for slow movement   Standing shoulder scaption*  Holding 2 lb weight; 20 reps/1 set with cues for good technique Holding 3 lb weight; 20 reps/1 set with cues for good technique Holding 4 lb dumbbells; 20 reps/1 set with cues for good technique   Hooklying chest press*  Holding 2 lb dumbbells; 20 reps/1 set with cues for good technique Holding 3 lb dumbbells; 20 reps/1 set with cues for good technique Holding 4 lb dumbbells; 20 reps/1 set with cues for good technique   Bicep curls  Holding 3 lb dumbbells; 20 reps/1 set with slow controlled movement Holding 4 lb dumbbells; 20 reps/1 set with slow controlled movement Holding 5 lb dumbbells; 20 reps/1 set with slow controlled movement   Tricep press  10 lb resistance at cable machine; 20 reps/1 set with slow controlled movement  12.5 lb resistance at cable machine; 10 reps/1 set with slow controlled movement   Varying resistance at R UE in different planes of motion Per strength assessment in chart      R shoulder AROM Per ROM assessment in chart      Standing shoulder abduction at wall   20 reps/1 set with verbal/visual cues to avoid going below 90 degrees of shoulder abduction 20 reps/1 set with verbal/visual cues to avoid going below 90 degrees of shoulder abduction   Shoulder press ups   No weight; 10 reps/1 set B UEs with cues for avoiding R shoulder elevation - pt stating slight discomfort in R shoulder so discontinued    Lat pull-downs    15 lb resistance; 20 reps/1 set with verbal/visual cues to depress shoulders with each rep and to allow relaxation of shoulders with eccentric movement   Standing shoulder adduction    5 lb resistance; 20 reps/1 set R UE with initial cues for correct technique     MANUAL THERAPY: ()    MODALITIES: () Pt states he will do at home    Treatment/Session Summary:    · Response to Treatment:  Pt able to demonstrate improved technique with most shoulder exercises this session, and able to increase resistance with most exercises as well. Will continue to progress exercises as tolerated by pt. No adverse reactions/unusual changes observed/reported in clinical status this session.   · Communication/Consultation:  None today  · Equipment provided today:  None today  · Recommendations/Intent for next treatment session: Next visit will focus on increasing R shoulder mobility and developing shoulder strengthening HEP  Treatment Plan of Care Effective Dates:  5/24/2019 to 7/1/2019  Total Treatment Billable Duration:  40 minutes  PT Patient Time In/Time Out  Time In: 0843  Time Out: 8004 Cayden Mccloud DPT    Future Appointments   Date Time Provider Patrice Salter   6/12/2019  8:00 AM RONAN Mario   6/19/2019  8:00 AM RONAN Mario   6/26/2019  8:00 AM Zeynep Estrella DPT [facility]

## 2019-06-12 ENCOUNTER — HOSPITAL ENCOUNTER (OUTPATIENT)
Dept: PHYSICAL THERAPY | Age: 64
Discharge: HOME | End: 2019-06-12
Payer: COMMERCIAL

## 2019-06-12 PROCEDURE — 97110 THERAPEUTIC EXERCISES: CPT

## 2019-06-12 NOTE — PROGRESS NOTES
Martha Rodriguez  : 1955  Primary: [facility]  Secondary:  [facility]rapy Center at 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 Post Session: no pain reported at end of session   Medications Last Reviewed:  2019  Updated Objective Findings: None Today   TREATMENT:     THERAPEUTIC EXERCISE: (39 minutes):  Exercises per grid below to improve mobility and strength.      Date:  2019 Date:  2019 Date:  2019 Date:  2019   Activity/Exercise       Isometric - shoulder flexion        Isometric - shoulder extension        Isometric - shoulder ER/IR       Isometric - shoulder abduction/adduction       Pulleys 20 reps/1 set flexion/scaption   20 reps/1 set into IR with cues to avoid range into discomfort/pain - encouraged pt to go slowly and carefully to protect  20 reps/1 set flexion/scaption   20 reps/1 set into IR with cues to avoid range into discomfort/pain - encouraged pt to go slowly and carefully to protect  20 reps/1 set flexion/scaption   20 reps/1 set into IR with cues to avoid range into discomfort/pain - encouraged pt to go slowly and carefully to protect graft 20 reps/1 set flexion/scaption   20 reps/1 set into IR with cues to avoid range into discomfort/pain - encouraged pt to go slowly and carefully to protect graft   UBE  L5 resistance for 4 minutes forward and 4 minutes backward L5 resistance for 4 minutes forward and 4 minutes backward L5.5 resistance for 4 minutes forward and 4 minutes backward L5.5 resistance for 4 minutes forward and 4 minutes backward   Standing shoulder ER/IR* Yellow theraband resistance for ER, green theratubing resistance for IR; 20 reps/1 set R UE each direction with cues for upright posture and slow movement throughout; cues to avoid painful AROM      Standing shoulder rows* 10 lb resistance at cable machine; 20 reps/1 set with cues for slow movement throughout 10 lb resistance at cable machine; 25 reps/1 set with cues for slow movement throughout 12.5 lb resistance at cable machine; 20 reps/1 set with cues for slow movement throughout, initial cues for correct technique due to pt being distracted 12.5 lb resistance at cable machine; 20 reps/1 set    Standing shoulder extension* 10 lb resistance at cable machine; 20 reps/1 set with cues for slow movement 10 lb resistance at cable machine; 25 reps/1 set with cues for slow movement 12.5 lb resistance at cable machine; 20 reps/1 set with cues for slow movement    Standing shoulder scaption* Holding 2 lb weight; 20 reps/1 set with cues for good technique Holding 3 lb weight; 20 reps/1 set with cues for good technique Holding 4 lb dumbbells; 20 reps/1 set with cues for good technique    Hooklying chest press* Holding 2 lb dumbbells; 20 reps/1 set with cues for good technique Holding 3 lb dumbbells; 20 reps/1 set with cues for good technique Holding 4 lb dumbbells; 20 reps/1 set with cues for good technique Progressed to performing standing at cable machine; 5 lb total; 20 reps/1 set with tactile and verbal cues for correct arm movement   Bicep curls Holding 3 lb dumbbells; 20 reps/1 set with slow controlled movement Holding 4 lb dumbbells; 20 reps/1 set with slow controlled movement Holding 5 lb dumbbells; 20 reps/1 set with slow controlled movement Holding 5 lb dumbbells; 20 reps/1 set with slow controlled movement   Tricep press 10 lb resistance at cable machine; 20 reps/1 set with slow controlled movement  12.5 lb resistance at cable machine; 10 reps/1 set with slow controlled movement    Varying resistance at R UE in different planes of motion       R shoulder AROM       Standing shoulder abduction at wall  20 reps/1 set with verbal/visual cues to avoid going below 90 degrees of shoulder abduction 20 reps/1 set with verbal/visual cues to avoid going below 90 degrees of shoulder abduction 20 reps/1 set with verbal/visual cues to avoid going below 90 degrees of shoulder abduction   Shoulder press ups  No weight; 10 reps/1 set B UEs with cues for avoiding R shoulder elevation - pt stating slight discomfort in R shoulder so discontinued     Lat pull-downs    15 lb resistance; 20 reps/1 set with verbal/visual cues to depress shoulders with each rep and to allow relaxation of shoulders with eccentric movement 15 lb resistance; 20 reps/1 set with verbal/visual cues to depress shoulders with each rep and to allow relaxation of shoulders with eccentric movement   Standing shoulder adduction   5 lb resistance; 20 reps/1 set R UE with initial cues for correct technique 5 lb resistance; 20 reps/1 set R UE with initial cues for correct technique     MANUAL THERAPY: ()    MODALITIES: () Pt states he will do at home    Treatment/Session Summary:    · Response to Treatment:  Pt able to progress chest press to standing this session with good technique and no reports of increased pain. Will continue to progress exercises as tolerated by pt. No adverse reactions/unusual changes observed/reported in clinical status this session.   · Communication/Consultation:  None today  · Equipment provided today:  None today  · Recommendations/Intent for next treatment session: Next visit will focus on increasing R shoulder mobility and developing shoulder strengthening HEP  Treatment Plan of Care Effective Dates:  5/24/2019 to 7/1/2019  Total Treatment Billable Duration:  40 minutes  PT Patient Time In/Time Out  Time In: 0801  Time Out: 7 Yakelin Leyva DPT    Future Appointments   Date Time Provider Patrice Salter   6/19/2019  8:00 AM Keron ANTONIO DPT [facility]   6/26/2019  8:00 AM Olga Stoddard DPT [facility]

## 2019-06-19 ENCOUNTER — HOSPITAL ENCOUNTER (OUTPATIENT)
Dept: PHYSICAL THERAPY | Age: 64
Discharge: HOME | End: 2019-06-19
Payer: COMMERCIAL

## 2019-06-19 PROCEDURE — 97110 THERAPEUTIC EXERCISES: CPT

## 2019-06-19 NOTE — PROGRESS NOTES
Jerardo Quevedo  : 1955  Primary: [facility]  Secondary:  [facility]rapy Center at 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 Post Session: no pain reported at end of session   Medications Last Reviewed:  2019  Updated Objective Findings: None Today   TREATMENT:     THERAPEUTIC EXERCISE: (39 minutes):  Exercises per grid below to improve mobility and strength.      Date:  2019 Date:  2019 Date:  2019 Date:  2019   Activity/Exercise       Isometric - shoulder flexion        Isometric - shoulder extension        Isometric - shoulder ER/IR       Isometric - shoulder abduction/adduction       Pulleys 20 reps/1 set flexion/scaption   20 reps/1 set into IR with cues to avoid range into discomfort/pain - encouraged pt to go slowly and carefully to protect  20 reps/1 set flexion/scaption   20 reps/1 set into IR with cues to avoid range into discomfort/pain - encouraged pt to go slowly and carefully to protect graft 20 reps/1 set flexion/scaption   20 reps/1 set into IR with cues to avoid range into discomfort/pain - encouraged pt to go slowly and carefully to protect graft 20 reps/1 set flexion/scaption 20 reps/1 set into IR with cues to avoid range into discomfort/pain - encouraged pt to go slowly and carefully to protect graft   UBE  L5 resistance for 4 minutes forward and 4 minutes backward L5.5 resistance for 4 minutes forward and 4 minutes backward L5.5 resistance for 4 minutes forward and 4 minutes backward L4 resistance for 4 minutes forward and 4 minutes backward   Standing shoulder ER/IR*       Standing shoulder rows* 10 lb resistance at cable machine; 25 reps/1 set with cues for slow movement throughout 12.5 lb resistance at cable machine; 20 reps/1 set with cues for slow movement throughout, initial cues for correct technique due to pt being distracted 12.5 lb resistance at cable machine; 20 reps/1 set  15 lb resistance at cable machine; 20 reps/1 set    Standing shoulder extension* 10 lb resistance at cable machine; 25 reps/1 set with cues for slow movement 12.5 lb resistance at cable machine; 20 reps/1 set with cues for slow movement  15 lb resistance at cable machine; 20 reps/1 set with cues for slow movement   Standing shoulder scaption* Holding 3 lb weight; 20 reps/1 set with cues for good technique Holding 4 lb dumbbells; 20 reps/1 set with cues for good technique     Hooklying chest press* Holding 3 lb dumbbells; 20 reps/1 set with cues for good technique Holding 4 lb dumbbells; 20 reps/1 set with cues for good technique Progressed to performing standing at cable machine; 5 lb total; 20 reps/1 set with tactile and verbal cues for correct arm movement Progressed to performing standing at cable machine; 5 lb total; 20 reps/1 set with tactile and verbal cues for correct arm movement   Bicep curls Holding 4 lb dumbbells; 20 reps/1 set with slow controlled movement Holding 5 lb dumbbells; 20 reps/1 set with slow controlled movement Holding 5 lb dumbbells; 20 reps/1 set with slow controlled movement    Tricep press  12.5 lb resistance at cable machine; 10 reps/1 set with slow controlled movement Varying resistance at R UE in different planes of motion       R shoulder AROM       Standing shoulder abduction at wall 20 reps/1 set with verbal/visual cues to avoid going below 90 degrees of shoulder abduction 20 reps/1 set with verbal/visual cues to avoid going below 90 degrees of shoulder abduction 20 reps/1 set with verbal/visual cues to avoid going below 90 degrees of shoulder abduction    Shoulder press ups No weight; 10 reps/1 set B UEs with cues for avoiding R shoulder elevation - pt stating slight discomfort in R shoulder so discontinued      Lat pull-downs   15 lb resistance; 20 reps/1 set with verbal/visual cues to depress shoulders with each rep and to allow relaxation of shoulders with eccentric movement 15 lb resistance; 20 reps/1 set with verbal/visual cues to depress shoulders with each rep and to allow relaxation of shoulders with eccentric movement 20 lb resistance; 20 reps/1 set with verbal/visual cues to depress shoulders with each rep and to allow relaxation of shoulders with eccentric movement   Standing shoulder adduction  5 lb resistance; 20 reps/1 set R UE with initial cues for correct technique 5 lb resistance; 20 reps/1 set R UE with initial cues for correct technique    Self-mobilizations at R shoulder leaning elbows on counter for posterior mobilization    X 1 minute with cues for correct technique                   MANUAL THERAPY: ()    MODALITIES: () Pt states he will do at home    Treatment/Session Summary:    · Response to Treatment: Inspected pt's R shoulder this session - no loose bodies or significant swelling noted. Instructed pt to continue to use ice at home to calm inflammation, since pain is most likely due to impingement with certain movements. Pt reporting no pain in superior shoulder during any exercises this session. No adverse reactions/unusual changes observed/reported in clinical status this session.   · Communication/Consultation:  None today  · Equipment provided today:  None today  · Recommendations/Intent for next treatment session: Next visit will focus on increasing R shoulder mobility and developing shoulder strengthening HEP  Treatment Plan of Care Effective Dates:  5/24/2019 to 7/1/2019  Total Treatment Billable Duration:  39 minutes  PT Patient Time In/Time Out  Time In: 0800  Time Out:   [address], DPT    Future Appointments   Date Time Provider Patrice Salter   6/26/2019  8:00 AM Cara Peter DPT [facility]

## 2019-06-26 ENCOUNTER — HOSPITAL ENCOUNTER (OUTPATIENT)
Dept: PHYSICAL THERAPY | Age: 64
Discharge: HOME | End: 2019-06-26
Payer: COMMERCIAL

## 2019-06-26 PROCEDURE — 97110 THERAPEUTIC EXERCISES: CPT

## 2019-06-26 PROCEDURE — 97140 MANUAL THERAPY 1/> REGIONS: CPT

## 2019-06-26 NOTE — THERAPY DISCHARGE
Emma Mar  : 1955  Primary: Altamease Josette Of Carmela Miranda*  Secondary:  [facility]rapy Center at [facility] [address], [address], [place] [address] [address] [facility]  [phone]   [phone]       OUTPATIENT PHYSICAL [facility] 2019    ICD-10: Treatment Diagnosis: Pain in right shoulder (M25.511)    Stiffness of right shoulder, not elsewhere classified (M25.611)   Precautions/Allergies:   Patient has no known allergies. 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 During this time, he has met 3 out of 4 of his short term goals and 3 out of 4 of his discharge goals. He has made significant improvements in his R shoulder mobility and strength, but does continue to have difficulty with functional tasks due to not being able to attend further therapy sessions (due to lack of insurance coverage). Will discharge pt from physical therapy at this time secondary to lack of further visits approved by insurance. PROBLEM LIST (Impacting functional limitations):  1. Decreased Strength  2. Decreased ADL/Functional Activities  3. Increased Pain  4. Decreased Activity Tolerance  5. Decreased Work Simplification/Energy Conservation Techniques  6. Increased Fatigue  7. Decreased Flexibility/Joint Mobility  8. Edema/Girth INTERVENTIONS PLANNED: (Treatment may consist of any combination of the following)  1.  Bed Mobility  2. Cold  3. Electrical Stimulation  4. Heat  5. Home Exercise Program (HEP)  6. Manual Therapy  7. Neuromuscular Re-education/Strengthening  8. Range of Motion (ROM)  9. Therapeutic Activites  10. Therapeutic Exercise/Strengthening  11. Transcutaneous Electrical Nerve Stimulation (TENS)  12. Ultrasound (US)   TREATMENT PLAN:  Effective Dates/Frequency/Duration: Twice per week from 5/24/2019 until 6/2/2019, followed by once per week from 6/3/2019 until 7/1/2019 (5 weeks). GOALS: (Goals have been discussed and agreed upon with patient.)  Short-Term Functional Goals: Time Frame: 4 weeks  1. Pt will be compliant with HEP in order to increase UE strength/endurance/mobility to improve functional mobility and overall quality of life. (MET , 4/26/2019)   2. Pt will improve score on the Disabilities of the Arm, Shoulder, and Hand (DASH) Questionnaire to 44/55 in order to improve independence with ADLs and IADLs and to improve overall quality of life. (MET , 5/24/2019)   3. Pt will increase right shoulder PROM to 150 degrees with minimal to no increase in pain in order to improve functional mobility and ability to reach up. (NOT MET, 6/26/2019)   4. Pt will be able to demonstrate good body mechanics while lifting 5 lb object up from the floor in order to minimize pain while lifting heavier items off of floor. (MET, 6/26/2019)   Discharge Goals: Time Frame: 8 weeks  1. Pt will be independent with HEP in order to increase UE strength/endurance/mobility to improve functional mobility and overall quality of life. (MET, 6/26/2019)   2. Pt will improve score on the Disabilities of the Arm, Shoulder, and Hand (DASH) Questionnaire to 34/55 in order to improve independence with ADLs and IADLs and to improve overall quality of life. (NOT MET, 6/26/2019)    3.  Pt will increase right shoulder flexion AROM to 120 degrees with minimal to no increase in pain in order to improve functional mobility and ability to reach up. (MET, 6/26/2019)   4. Pt will be able to demonstrate good body mechanics while lifting 10 lb object up from the floor in order to minimize pain while performing job duties. (MET, 6/26/2019)     CLINICAL DECISION MAKING:   OUTCOME MEASURE:   Tool Used: Disabilities of the Arm, Shoulder and Hand (DASH) Questionnaire - Quick Version  Score:  Initial: 54/55  Most Recent: 46/55 (Date: 6/26/2019 )   Interpretation of Score: The DASH is designed to measure the activities of daily living in person's with upper extremity dysfunction or pain. Each section is scored on a 1-5 scale, 5 representing the greatest disability. The scores of each section are added together for a total score of 55.       UPDATED OBJECTIVE FINDINGS:    Observation/Orthostatic Postural Assessment:    The following postural deficits were noted in sitting: loss of cervical lordosis, increased thoracic kyphosis, forward head, rounded shoulders and posterior pelvic tilt; pt not wearing sling on R UE and is cradling it - pt with improved upright posture on 6/26/2019 with no sling  The following postural deficits were noted in standing: slight forward trunk flexion  - slightly improved upright posture on 6/26/2019    ROM:    Initial measurement: (on 4/3/2019) Initial measurement: (on 4/3/2019) Reassessment measurement: (on 5/24/2019) Reassessment measurement: (on 6/26/2019)   L shoulder AROM:     Shoulder flexion: 162 degrees     Shoulder abduction: 165 degrees     Shoulder extension: 84 degrees     Shoulder ER: 72 degrees     Shoulder IR: level of T6    R UE AROM: NT on 4/3/2019  R UE PROM:     Shoulder flexion: 132 degrees (pain in superior shoulder)     Shoulder abduction: 108 degrees (pain in superior shoulder)     Shoulder ER at 15 degrees abduction: 71 degrees     Shoulder ER at 45 degrees abduction: 75 degrees      Shoulder ER at 90 degrees abduction: 71 degrees   R UE AROM:      Shoulder flexion: 135 degrees (pain in superior shoulde - cues to avoid overhead)     Shoulder abduction: 118 degrees (before significant shoulder elevation - pain in superior shoulder)     Shoulder extension: 65 degrees (before significant compensation)     Shoulder ER: 61 degrees     Shoulder IR: level of T10  R UE PROM:     Shoulder flexion: 144 degrees (to significant discomfort - pain deep in superior shoulder)     Shoulder abduction: 162 degrees (to significant discomfort - deep pain in superior shoulder)     Shoulder ER at 15 degrees abduction: 66 degrees     Shoulder ER at 45 degrees abduction: 75 degrees      Shoulder ER at 90 degrees abduction: 70 degrees R UE AROM:      Shoulder flexion: 143 degrees      Shoulder abduction: 120 degrees (before significant shoulder elevation)     Shoulder extension: 65 degrees     Shoulder ER: 63 degrees     Shoulder IR: level of T10  R UE PROM:     Shoulder flexion: 155 degrees (to significant pain)     Shoulder abduction: 164 degrees (to significant pain)     Shoulder ER at 15 degrees abduction: 66 degrees     Shoulder ER at 45 degrees abduction: 76 degrees      Shoulder ER at 90 degrees abduction: 70 degrees     Strength:    Initial measurement: (on 4/3/2019) Initial measurement: (on 4/3/2019)  Reassessment  (on 4/26/2019) Reassessment:  (on 5/24/2019) Reassessment (on 6/26/2019)    L UE: R UE: R UE: (no resistance applied due to precautions) R UE: (* = minimal resistance applied due to precautions) R UE:   Shoulder flexion  4/5  NT  3-/5 4-/5* 4/5   Shoulder extension 5/5  NT  3-/5 4+/5 5/5   Shoulder abduction 4/5  NT  3-/5 4-/5* 4/5   Shoulder adduction 5/5  NT  3/5 4+/5 5/5   Shoulder IR  5/5  NT  3/5 4-/5* 4/5   Shoulder ER 5/5  NT  3-/5 3+/5* 4-/5   Elbow flexion 5/5  NT  3/5 4-/5* 5/5   Elbow extension 5/5  NT  3/5 4+/5 4+/5       MEDICAL NECESSITY:   · Patient is expected to demonstrate progress in strength, range of motion and functional technique to improve ability to perform pain-free ADLs/IADLs and to improve overall quality of life.  REASON FOR SERVICES/OTHER COMMENTS:  · Patient continues to require modification of therapeutic interventions to increase complexity of exercises. Use of outcome tool(s) and clinical judgement create a POC that gives a: Clear prediction of patient's progress: LOW COMPLEXITY      Total Duration:  PT Patient Time In/Time Out  Time In: 0800  Time Out: 0845    Rehabilitation Potential For Stated Goals: Good    Thank you for this referral,  Anupam Scott, DPT     Referring Physician Signature: Gigi Keating MD No Signature is Required for this note.

## 2019-06-26 NOTE — PROGRESS NOTES
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 Make sure you are using a visit which is associated with an episode. Pre-treatment Symptoms/Complaints: Patient stating he still gets sharp pain in his R shoulder sometimes; pt frustrated with him still not being able to do as much with his arm; states he started to  a 20 lb ladder yesterday and his R forearm started tingling  Pain: Initial:   2/10. Post Session: no pain reported at end of session   Medications Last Reviewed:  2019  Updated Objective Findings: See evaluation note from today   TREATMENT:     THERAPEUTIC EXERCISE: (23 minutes):  Exercises per grid below (and assessment in chart on 2019) to improve mobility and strength.      Date:  2019 Date:  2019 Date:  2019 Date:  2019   Activity/Exercise       Isometric - shoulder flexion        Isometric - shoulder extension        Isometric - shoulder ER/IR       Isometric - shoulder abduction/adduction       Pulleys 20 reps/1 set flexion/scaption   20 reps/1 set into IR with cues to avoid range into discomfort/pain - encouraged pt to go slowly and carefully to protect graft 20 reps/1 set flexion/scaption   20 reps/1 set into IR with cues to avoid range into discomfort/pain - encouraged pt to go slowly and carefully to protect graft 20 reps/1 set flexion/scaption   20 reps/1 set into IR with cues to avoid range into discomfort/pain - encouraged pt to go slowly and carefully to protect graft 20 reps/1 set flexion/scaption    UBE  L5.5 resistance for 4 minutes forward and 4 minutes backward L5.5 resistance for 4 minutes forward and 4 minutes backward L4 resistance for 4 minutes forward and 4 minutes backward L5.5 resistance for 4 minutes forward and 4 minutes backward   Standing shoulder ER/IR*       Standing shoulder rows* 12.5 lb resistance at cable machine; 20 reps/1 set with cues for slow movement throughout, initial cues for correct technique due to pt being distracted 12.5 lb resistance at cable machine; 20 reps/1 set  15 lb resistance at cable machine; 20 reps/1 set     Standing shoulder extension* 12.5 lb resistance at cable machine; 20 reps/1 set with cues for slow movement  15 lb resistance at cable machine; 20 reps/1 set with cues for slow movement    Standing shoulder scaption* Holding 4 lb dumbbells; 20 reps/1 set with cues for good technique      Hooklying chest press* Holding 4 lb dumbbells; 20 reps/1 set with cues for good technique Progressed to performing standing at cable machine; 5 lb total; 20 reps/1 set with tactile and verbal cues for correct arm movement Progressed to performing standing at cable machine; 5 lb total; 20 reps/1 set with tactile and verbal cues for correct arm movement    Bicep curls Holding 5 lb dumbbells; 20 reps/1 set with slow controlled movement Holding 5 lb dumbbells; 20 reps/1 set with slow controlled movement     Tricep press 12.5 lb resistance at cable machine; 10 reps/1 set with slow controlled movement      Varying resistance at R UE in different planes of motion    Per strength assessment in chart   R shoulder AROM    Per AROM assessment in chart   Standing shoulder abduction at wall 20 reps/1 set with verbal/visual cues to avoid going below 90 degrees of shoulder abduction 20 reps/1 set with verbal/visual cues to avoid going below 90 degrees of shoulder abduction     Shoulder press ups       Lat pull-downs  15 lb resistance; 20 reps/1 set with verbal/visual cues to depress shoulders with each rep and to allow relaxation of shoulders with eccentric movement 15 lb resistance; 20 reps/1 set with verbal/visual cues to depress shoulders with each rep and to allow relaxation of shoulders with eccentric movement 20 lb resistance; 20 reps/1 set with verbal/visual cues to depress shoulders with each rep and to allow relaxation of shoulders with eccentric movement    Standing shoulder adduction 5 lb resistance; 20 reps/1 set R UE with initial cues for correct technique 5 lb resistance; 20 reps/1 set R UE with initial cues for correct technique     Self-mobilizations at R shoulder leaning elbows on counter for posterior mobilization   X 1 minute with cues for correct technique    Self- inferior mobilizations at R shoulder leaning away from anchored R hand     Pt performed 1 trial of 10-20 second hold with R hand anchored at mat and leaning to L side, as well as one trial of 10-20 second hold with R hand anchored at doorway and leaning back for distraction mobilization            MANUAL THERAPY: (15 minutes): With pt in supported supine position, passive ROM in multiple planes of motion to R shoulder (flexion, abduction, ER at 15, 45, and 90 degrees of abduction) to increase R shoulder mobility and reduce muscular tightness and pain. With pt in supported supine position, gentle inferior distraction mobilizations as well as posterior mobilizations to R glenohumeral joint (grade 2-3) as well as STM to R pectoral and bicep musculature to reduce muscular tightness and pain and to break up scar adhesions to improve R shoulder mobility. Treatment/Session Summary:    · Response to Treatment: See assessment in chart. No adverse reactions/unusual changes observed/reported in clinical status this session.   · Communication/Consultation:  None today  · Equipment provided today:  None today    Treatment Plan of Care Effective Dates: 5/24/2019 to 7/1/2019  Total Treatment Billable Duration:  38 minutes  PT Patient Time In/Time Out  Time In: 0800  Time Out: 7 Yakelin Leyva DPT    No future appointments.

## 2019-09-25 PROBLEM — Z00.00 WELLNESS EXAMINATION: Status: RESOLVED | Noted: 2017-02-13 | Resolved: 2019-09-25

## 2020-09-04 NOTE — PROGRESS NOTES
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 \"  Pain: Initial:   5-/10. Post Session:  4/10   Medications Last Reviewed:  2019  Updated Objective Findings: None Today   TREATMENT:     THERAPEUTIC EXERCISE: (8 minutes):  Exercises per grid below to improve mobility and strength. Date:  4-10-19 Date:  19 Date:  19   Activity/Exercise Parameters Parameters Parameters   Isometric - shoulder flexion  10 x 3 sec hold  3 second hold x 10 reps 3 second hold x 10 reps   Isometric - shoulder extension  10 x 3 sec hold  3 second hold x 10 reps 3 second hold x 10 reps                                   MANUAL THERAPY: (25 minutes): Joint mobilization and Soft tissue mobilization was utilized and necessary because of the patient's restricted joint motion, painful spasm, loss of articular motion and restricted motion of soft tissue. With pt in supported supine position (B LEs elevated on wedge), passive ROM to R shoulder in multiple planes of motion (flexion, abduction, ER at 0, 45, and 90 degrees abduction) with intermittent inferior distraction mobilizations (grade 2-3) to R glenohumeral joint to improve R shoulder mobility and reduce muscular tightness and pain.  With pt in supported in sitting with right arm on pillows and plinth  (B LEs elevated on wedge), STM to right upper arm, deltoid, upper trapm pectoralis and pectoralis minor, scapular border and posterior shoulder to help decrease tightness and increase blood flow. MODALITIES: (12 minutes): With pt in supported sitting position, cold pack applied to R shoulder concurrently with IFES surrounding right shoulder at 24 intensity to same region to reduce pain and inflammation at end of session. Skin clear and intact. Treatment/Session Summary:    · Response to Treatment:  No increased pain at end of treatment. Patient reports following protocol for shoulder at home. ICE an IFES help decrease pain in shoulder. No adverse reactions/unusual changes observed/reported in clinical status this session.   · Communication/Consultation:  None today  · Equipment provided today:  None today  · Recommendations/Intent for next treatment session: Next visit will focus on increasing R shoulder mobility, working on shoulder isometrics to increase strength gently  Treatment Plan of Care Effective Dates:  4/3/2019 to 6/2/2019  Total Treatment Billable Duration:  45 minutes  PT Patient Time In/Time Out  Time In: 0845  Time Out: [address]    Future Appointments   Date Time Provider Patrice Salter   4/19/2019  9:30 AM Colette Hairston PTA [facility]   4/24/2019  8:45 AM RONAN Arizmendi [facility]   4/26/2019  8:00 AM RONAN Arizmendi   4/29/2019  8:00 AM SHELLI PHYSICIAL THERAPIST [facility]   5/1/2019  8:00 AM RONAN Ibarra [facility]
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
## (undated) DEVICE — SET IRRIG W 96IN TBNG 4 LN FLX BG

## (undated) DEVICE — NEEDLE HYPO 18GA L1.5IN PNK S STL HUB POLYPR SHLD REG BVL

## (undated) DEVICE — SINGLE PORT MANIFOLD: Brand: NEPTUNE 2

## (undated) DEVICE — INTENDED FOR TISSUE SEPARATION, AND OTHER PROCEDURES THAT REQUIRE A SHARP SURGICAL BLADE TO PUNCTURE OR CUT.: Brand: BARD-PARKER ® STAINLESS STEEL BLADES

## (undated) DEVICE — SOFT SILICONE HYDROCELLULAR FOAM DRESSING WITH LOCK AWAY LAYER: Brand: ALLEVYN LIFE XL 21X21 CTN10

## (undated) DEVICE — [RESECTOR CUTTER, ARTHROSCOPIC SHAVER BLADE,  DO NOT RESTERILIZE,  DO NOT USE IF PACKAGE IS DAMAGED,  KEEP DRY,  KEEP AWAY FROM SUNLIGHT]: Brand: FORMULA

## (undated) DEVICE — 3M™ TEGADERM™ TRANSPARENT FILM DRESSING FRAME STYLE, 1627, 4 IN X 10 IN (10 CM X 25 CM), 20/CT 4CT/CASE: Brand: 3M™ TEGADERM™

## (undated) DEVICE — SET IRRIG DST FLX M CONN

## (undated) DEVICE — [THREADED CANNULA.  DO NOT RESTERILIZE,  DO NOT USE IF PACKAGE IS DAMAGED]: Brand: DRI-LOK

## (undated) DEVICE — MASTISOL ADHESIVE LIQ 2/3ML

## (undated) DEVICE — (D)PREP SKN CHLRAPRP APPL 26ML -- CONVERT TO ITEM 371833

## (undated) DEVICE — RESTRAINT UNIVERSAL HEAD DISP --

## (undated) DEVICE — SUTURE MCRYL SZ 3-0 L27IN ABSRB UD L19MM PS-2 3/8 CIR PRIM Y427H

## (undated) DEVICE — LAMINECTOMY ARM CRADLE FOAM POSITIONER: Brand: CARDINAL HEALTH

## (undated) DEVICE — (D)STRIP SKN CLSR 0.5X4IN WHT --

## (undated) DEVICE — SURGICAL PROCEDURE PACK BASIC ST FRANCIS

## (undated) DEVICE — STOCKINETTE ORTH W9XL36IN COT 2 PLY HLLW FOR HANDLING LMB

## (undated) DEVICE — KENDALL SCD EXPRESS SLEEVES, KNEE LENGTH, MEDIUM: Brand: KENDALL SCD

## (undated) DEVICE — 90-S ACCELERATOR, SUCTION PROBE, NON-BENDABLE, MAX CUT LEVEL 11: Brand: SERFAS ENERGY

## (undated) DEVICE — [AGGRESSIVE 6-FLUTE BARREL BUR, ARTHROSCOPIC SHAVER BLADE,  DO NOT RESTERILIZE,  DO NOT USE IF PACKAGE IS DAMAGED,  KEEP DRY,  KEEP AWAY FROM SUNLIGHT]: Brand: FORMULA

## (undated) DEVICE — SYR 50ML LR LCK 1ML GRAD NSAF --

## (undated) DEVICE — NDL SPNE QNCKE 18GX3.5IN LF --

## (undated) DEVICE — SUTURE VCRL SZ 0 L27IN ABSRB VLT L26MM CT-2 1/2 CIR J334H

## (undated) DEVICE — IMMOBILIZER ORTH LIGHTWEIGHT LG UNIV 9X7 IN PREMIERPRO

## (undated) DEVICE — DRAPE,SHOULDER,BEACH CHAIR,STERILE: Brand: MEDLINE

## (undated) DEVICE — BANDAGE COMPR SELF ADH 5 YDX6 IN TAN STRL PREMIERPRO LF

## (undated) DEVICE — SOLUTION IRRIG 3000ML 0.9% SOD CHL FLX CONT 0797208] ICU MEDICAL INC]

## (undated) DEVICE — REM POLYHESIVE ADULT PATIENT RETURN ELECTRODE: Brand: VALLEYLAB